# Patient Record
Sex: FEMALE | Race: WHITE | NOT HISPANIC OR LATINO | Employment: OTHER | URBAN - METROPOLITAN AREA
[De-identification: names, ages, dates, MRNs, and addresses within clinical notes are randomized per-mention and may not be internally consistent; named-entity substitution may affect disease eponyms.]

---

## 2023-02-21 PROBLEM — R42 ORTHOSTATIC LIGHTHEADEDNESS: Status: ACTIVE | Noted: 2023-02-21

## 2023-02-21 PROBLEM — I87.2 VENOUS INSUFFICIENCY OF BOTH LOWER EXTREMITIES: Status: ACTIVE | Noted: 2023-02-21

## 2023-02-21 PROBLEM — R79.89 ELEVATED SERUM CREATININE: Status: ACTIVE | Noted: 2023-02-21

## 2023-02-21 PROBLEM — E78.5 HYPERLIPIDEMIA: Status: ACTIVE | Noted: 2023-02-21

## 2023-02-21 PROBLEM — M17.11 PRIMARY OSTEOARTHRITIS OF RIGHT KNEE: Status: ACTIVE | Noted: 2023-02-21

## 2023-02-21 PROBLEM — E11.29 DIABETES MELLITUS WITH MICROALBUMINURIA (MULTI): Status: ACTIVE | Noted: 2023-02-21

## 2023-02-21 PROBLEM — E55.9 VITAMIN D DEFICIENCY: Status: ACTIVE | Noted: 2023-02-21

## 2023-02-21 PROBLEM — R32 URINARY INCONTINENCE: Status: ACTIVE | Noted: 2023-02-21

## 2023-02-21 PROBLEM — N28.9 NEPHROPATHY: Status: ACTIVE | Noted: 2023-02-21

## 2023-02-21 PROBLEM — R42 DIZZINESS, NONSPECIFIC: Status: ACTIVE | Noted: 2023-02-21

## 2023-02-21 PROBLEM — N89.8 VAGINAL PRURITUS: Status: ACTIVE | Noted: 2023-02-21

## 2023-02-21 PROBLEM — F41.8 SITUATIONAL ANXIETY: Status: ACTIVE | Noted: 2023-02-21

## 2023-02-21 PROBLEM — N18.30 CKD STAGE 3 SECONDARY TO DIABETES (MULTI): Status: ACTIVE | Noted: 2023-02-21

## 2023-02-21 PROBLEM — N20.0 NEPHROLITHIASIS: Status: ACTIVE | Noted: 2023-02-21

## 2023-02-21 PROBLEM — R79.89 LOW VITAMIN D LEVEL: Status: ACTIVE | Noted: 2023-02-21

## 2023-02-21 PROBLEM — E66.811 CLASS 1 OBESITY WITH BODY MASS INDEX (BMI) OF 31.0 TO 31.9 IN ADULT: Status: ACTIVE | Noted: 2023-02-21

## 2023-02-21 PROBLEM — M46.1 SACROILIITIS (CMS-HCC): Status: ACTIVE | Noted: 2023-02-21

## 2023-02-21 PROBLEM — L65.9 PATCHY LOSS OF HAIR: Status: ACTIVE | Noted: 2023-02-21

## 2023-02-21 PROBLEM — I44.2 THIRD DEGREE AV BLOCK (MULTI): Status: ACTIVE | Noted: 2023-02-21

## 2023-02-21 PROBLEM — M48.061 LUMBAR SPINAL STENOSIS: Status: ACTIVE | Noted: 2023-02-21

## 2023-02-21 PROBLEM — R35.89 POLYURIA: Status: ACTIVE | Noted: 2023-02-21

## 2023-02-21 PROBLEM — R30.0 DYSURIA: Status: ACTIVE | Noted: 2023-02-21

## 2023-02-21 PROBLEM — R42 VERTIGO: Status: ACTIVE | Noted: 2023-02-21

## 2023-02-21 PROBLEM — N32.89 BLADDER SPASMS: Status: ACTIVE | Noted: 2023-02-21

## 2023-02-21 PROBLEM — I48.91 AF (ATRIAL FIBRILLATION) (MULTI): Status: ACTIVE | Noted: 2023-02-21

## 2023-02-21 PROBLEM — M25.519 PAIN, JOINT, SHOULDER: Status: ACTIVE | Noted: 2023-02-21

## 2023-02-21 PROBLEM — R06.02 SHORTNESS OF BREATH: Status: ACTIVE | Noted: 2023-02-21

## 2023-02-21 PROBLEM — M21.70 LEG LENGTH DISCREPANCY: Status: ACTIVE | Noted: 2023-02-21

## 2023-02-21 PROBLEM — R39.9 UTI SYMPTOMS: Status: ACTIVE | Noted: 2023-02-21

## 2023-02-21 PROBLEM — K11.8 PAROTID MASS: Status: ACTIVE | Noted: 2023-02-21

## 2023-02-21 PROBLEM — E11.22 CKD STAGE 3 SECONDARY TO DIABETES (MULTI): Status: ACTIVE | Noted: 2023-02-21

## 2023-02-21 PROBLEM — R82.89 URINE CYTOLOGY ABNORMAL: Status: ACTIVE | Noted: 2023-02-21

## 2023-02-21 PROBLEM — B02.9 HERPES ZOSTER: Status: ACTIVE | Noted: 2023-02-21

## 2023-02-21 PROBLEM — M48.062 NEUROGENIC CLAUDICATION DUE TO LUMBAR SPINAL STENOSIS: Status: ACTIVE | Noted: 2023-02-21

## 2023-02-21 PROBLEM — R26.81 UNSTEADY GAIT: Status: ACTIVE | Noted: 2023-02-21

## 2023-02-21 PROBLEM — R39.15 URINARY URGENCY: Status: ACTIVE | Noted: 2023-02-21

## 2023-02-21 PROBLEM — L65.9 HAIR LOSS: Status: ACTIVE | Noted: 2023-02-21

## 2023-02-21 PROBLEM — M54.9 BACK PAIN, CHRONIC: Status: ACTIVE | Noted: 2023-02-21

## 2023-02-21 PROBLEM — R20.2 PARESTHESIA OF BILATERAL LEGS: Status: ACTIVE | Noted: 2023-02-21

## 2023-02-21 PROBLEM — M25.50 ARTHRALGIA OF MULTIPLE SITES: Status: ACTIVE | Noted: 2023-02-21

## 2023-02-21 PROBLEM — I25.10 CAD IN NATIVE ARTERY: Status: ACTIVE | Noted: 2023-02-21

## 2023-02-21 PROBLEM — E87.6 HYPOKALEMIA: Status: ACTIVE | Noted: 2023-02-21

## 2023-02-21 PROBLEM — G89.29 BACK PAIN, CHRONIC: Status: ACTIVE | Noted: 2023-02-21

## 2023-02-21 PROBLEM — R00.0 TACHYCARDIA, UNSPECIFIED: Status: ACTIVE | Noted: 2023-02-21

## 2023-02-21 PROBLEM — D47.2 IGG LAMBDA MONOCLONAL GAMMOPATHY: Status: ACTIVE | Noted: 2023-02-21

## 2023-02-21 PROBLEM — R29.898 HIP WEAKNESS: Status: ACTIVE | Noted: 2023-02-21

## 2023-02-21 PROBLEM — I49.5 SINOATRIAL NODE DYSFUNCTION (MULTI): Status: ACTIVE | Noted: 2023-02-21

## 2023-02-21 PROBLEM — E03.9 HYPOTHYROIDISM: Status: ACTIVE | Noted: 2023-02-21

## 2023-02-21 PROBLEM — Z95.0 PRESENCE OF CARDIAC PACEMAKER: Status: ACTIVE | Noted: 2023-02-21

## 2023-02-21 PROBLEM — M43.16 SPONDYLOLISTHESIS OF LUMBAR REGION: Status: ACTIVE | Noted: 2023-02-21

## 2023-02-21 PROBLEM — E66.9 CLASS 1 OBESITY WITH BODY MASS INDEX (BMI) OF 31.0 TO 31.9 IN ADULT: Status: ACTIVE | Noted: 2023-02-21

## 2023-02-21 PROBLEM — I10 HYPERTENSION: Status: ACTIVE | Noted: 2023-02-21

## 2023-02-21 PROBLEM — R79.9 ABNORMAL BLOOD CHEMISTRY: Status: ACTIVE | Noted: 2023-02-21

## 2023-02-21 PROBLEM — R80.9 DIABETES MELLITUS WITH MICROALBUMINURIA (MULTI): Status: ACTIVE | Noted: 2023-02-21

## 2023-02-21 PROBLEM — R79.89 ELEVATED BRAIN NATRIURETIC PEPTIDE (BNP) LEVEL: Status: ACTIVE | Noted: 2023-02-21

## 2023-02-21 RX ORDER — LISINOPRIL 5 MG/1
1 TABLET ORAL DAILY
COMMUNITY
Start: 2018-10-16 | End: 2023-05-23

## 2023-02-21 RX ORDER — BLOOD-GLUCOSE METER
KIT MISCELLANEOUS
COMMUNITY
End: 2023-03-28 | Stop reason: WASHOUT

## 2023-02-21 RX ORDER — ASPIRIN 81 MG/1
1 TABLET ORAL DAILY
COMMUNITY

## 2023-02-21 RX ORDER — OMEPRAZOLE 20 MG/1
TABLET, DELAYED RELEASE ORAL AS NEEDED
COMMUNITY
Start: 2017-12-14

## 2023-02-21 RX ORDER — ALPRAZOLAM 0.25 MG/1
1-2 TABLET ORAL NIGHTLY PRN
COMMUNITY
Start: 2018-03-23 | End: 2023-05-26 | Stop reason: SDUPTHER

## 2023-02-21 RX ORDER — MELOXICAM 15 MG/1
15 TABLET ORAL 2 TIMES WEEKLY
COMMUNITY
Start: 2018-11-21 | End: 2023-05-26 | Stop reason: SDUPTHER

## 2023-02-21 RX ORDER — ROSUVASTATIN CALCIUM 40 MG/1
1 TABLET, COATED ORAL DAILY
COMMUNITY
Start: 2017-02-28 | End: 2024-06-04 | Stop reason: SDUPTHER

## 2023-02-21 RX ORDER — LEVOTHYROXINE SODIUM 50 UG/1
1 TABLET ORAL DAILY
COMMUNITY
Start: 2019-03-14 | End: 2023-05-29 | Stop reason: SDUPTHER

## 2023-03-24 ASSESSMENT — ENCOUNTER SYMPTOMS
SPEECH DIFFICULTY: 0
WEIGHT LOSS: 0
SEIZURES: 0
TREMORS: 0
CONFUSION: 0
FATIGUE: 0
BLURRED VISION: 0
DIZZINESS: 0
HEADACHES: 0
POLYDIPSIA: 0
WEAKNESS: 0
NERVOUS/ANXIOUS: 0
HUNGER: 0
VISUAL CHANGE: 0
POLYPHAGIA: 0
BLACKOUTS: 0
SWEATS: 0

## 2023-03-28 ENCOUNTER — OFFICE VISIT (OUTPATIENT)
Dept: PRIMARY CARE | Facility: CLINIC | Age: 88
End: 2023-03-28
Payer: MEDICARE

## 2023-03-28 VITALS
OXYGEN SATURATION: 99 % | DIASTOLIC BLOOD PRESSURE: 72 MMHG | TEMPERATURE: 97.3 F | BODY MASS INDEX: 30 KG/M2 | WEIGHT: 174.8 LBS | SYSTOLIC BLOOD PRESSURE: 130 MMHG | HEART RATE: 85 BPM

## 2023-03-28 DIAGNOSIS — E11.22 CKD STAGE 3 SECONDARY TO DIABETES (MULTI): ICD-10-CM

## 2023-03-28 DIAGNOSIS — R80.9 DIABETES MELLITUS WITH MICROALBUMINURIA (MULTI): ICD-10-CM

## 2023-03-28 DIAGNOSIS — E11.29 DIABETES MELLITUS WITH MICROALBUMINURIA (MULTI): ICD-10-CM

## 2023-03-28 DIAGNOSIS — N18.30 CKD STAGE 3 SECONDARY TO DIABETES (MULTI): ICD-10-CM

## 2023-03-28 DIAGNOSIS — M46.1 SACROILIITIS (CMS-HCC): ICD-10-CM

## 2023-03-28 DIAGNOSIS — I48.0 PAROXYSMAL ATRIAL FIBRILLATION (MULTI): ICD-10-CM

## 2023-03-28 DIAGNOSIS — B02.29 POST HERPETIC NEURALGIA: Primary | ICD-10-CM

## 2023-03-28 DIAGNOSIS — Z00.00 ENCOUNTER FOR INITIAL PREVENTIVE PHYSICAL EXAMINATION COVERED BY MEDICARE: ICD-10-CM

## 2023-03-28 PROCEDURE — 3075F SYST BP GE 130 - 139MM HG: CPT | Performed by: STUDENT IN AN ORGANIZED HEALTH CARE EDUCATION/TRAINING PROGRAM

## 2023-03-28 PROCEDURE — 3078F DIAST BP <80 MM HG: CPT | Performed by: STUDENT IN AN ORGANIZED HEALTH CARE EDUCATION/TRAINING PROGRAM

## 2023-03-28 PROCEDURE — 99214 OFFICE O/P EST MOD 30 MIN: CPT | Performed by: STUDENT IN AN ORGANIZED HEALTH CARE EDUCATION/TRAINING PROGRAM

## 2023-03-28 PROCEDURE — 1160F RVW MEDS BY RX/DR IN RCRD: CPT | Performed by: STUDENT IN AN ORGANIZED HEALTH CARE EDUCATION/TRAINING PROGRAM

## 2023-03-28 PROCEDURE — 1036F TOBACCO NON-USER: CPT | Performed by: STUDENT IN AN ORGANIZED HEALTH CARE EDUCATION/TRAINING PROGRAM

## 2023-03-28 PROCEDURE — 1159F MED LIST DOCD IN RCRD: CPT | Performed by: STUDENT IN AN ORGANIZED HEALTH CARE EDUCATION/TRAINING PROGRAM

## 2023-03-28 RX ORDER — TRIAMCINOLONE ACETONIDE 1 MG/G
CREAM TOPICAL 2 TIMES DAILY
Qty: 15 G | Refills: 0 | Status: SHIPPED | OUTPATIENT
Start: 2023-03-28 | End: 2023-09-08 | Stop reason: ALTCHOICE

## 2023-03-28 ASSESSMENT — ENCOUNTER SYMPTOMS
SPEECH DIFFICULTY: 0
NERVOUS/ANXIOUS: 0
FATIGUE: 0
CONFUSION: 0
WEAKNESS: 0
HUNGER: 0
HEADACHES: 0
SWEATS: 0
VISUAL CHANGE: 0
POLYPHAGIA: 0
DIZZINESS: 0
SEIZURES: 0
TREMORS: 0
BLACKOUTS: 0
BLURRED VISION: 0
POLYDIPSIA: 0
WEIGHT LOSS: 0

## 2023-03-28 NOTE — ASSESSMENT & PLAN NOTE
Has been stable.  We will recheck.  She has seen nephrology in the past who has okayed all of her medications that she is currently on

## 2023-03-28 NOTE — PROGRESS NOTES
Subjective   Patient ID: Azucena Nolen is a 89 y.o. female who presents for Follow-up (Shingles and pain since January 15th.  ).    HPI:  Patient was dx with shingles on her left side of her back in January and she was given valacyclovir for a week. It did get better but she is still having nerve pain in the area. It is sometimes shooting. It is sensitive to touch. No more rash. She hasn't done anything for it.     Diabetes  She has type 2 diabetes mellitus. No MedicAlert identification noted. The initial diagnosis of diabetes was made 2 years ago. Pertinent negatives for hypoglycemia include no confusion, dizziness, headaches, hunger, mood changes, nervousness/anxiousness, pallor, seizures, sleepiness, speech difficulty, sweats or tremors. Associated symptoms include foot paresthesias. Pertinent negatives for diabetes include no blurred vision, no chest pain, no fatigue, no foot ulcerations, no polydipsia, no polyphagia, no polyuria, no visual change, no weakness and no weight loss. Pertinent negatives for hypoglycemia complications include no blackouts, no hospitalization, no nocturnal hypoglycemia, no required assistance and no required glucagon injection. Diabetic complications include heart disease, nephropathy and peripheral neuropathy. Pertinent negatives for diabetic complications include no CVA, impotence, PVD or retinopathy. Risk factors for coronary artery disease include dyslipidemia, family history, obesity and sedentary lifestyle. Current diabetic treatment includes diet and oral agent (monotherapy). She is compliant with treatment most of the time. She is following a generally healthy diet. Meal planning includes avoidance of concentrated sweets. She has not had a previous visit with a dietitian. She participates in exercise intermittently. She does not see a podiatrist.Eye exam is not current.      Her glucometer hasn't been working. She has been watching what she eats. She hasn't been having excess  sugar, just naturally in fruit.    She last had her kidney function checked in January her GFR was 29 which is slightly lower than normal.    She follows with cardiology for her atrial fibrillation.  She is on low-dose Eliquis and aspirin.    Review of Systems   Constitutional:  Negative for fatigue and weight loss.   Eyes:  Negative for blurred vision.   Cardiovascular:  Negative for chest pain.   Endocrine: Negative for polydipsia, polyphagia and polyuria.   Genitourinary:  Negative for impotence.   Skin:  Negative for pallor.   Neurological:  Negative for dizziness, tremors, seizures, speech difficulty, weakness and headaches.   Psychiatric/Behavioral:  Negative for confusion. The patient is not nervous/anxious.        Objective   /72 (BP Location: Left arm, Patient Position: Sitting, BP Cuff Size: Adult)   Pulse 85   Temp 36.3 °C (97.3 °F)   Wt 79.3 kg (174 lb 12.8 oz)   SpO2 99%   BMI 30.00 kg/m²     Physical Exam  Constitutional:       Appearance: Normal appearance.   HENT:      Head: Normocephalic and atraumatic.   Eyes:      Extraocular Movements: Extraocular movements intact.      Pupils: Pupils are equal, round, and reactive to light.   Cardiovascular:      Rate and Rhythm: Normal rate and regular rhythm.      Heart sounds: Normal heart sounds. No murmur heard.  Pulmonary:      Effort: Pulmonary effort is normal.      Breath sounds: Normal breath sounds. No wheezing.   Abdominal:      General: Bowel sounds are normal.      Palpations: Abdomen is soft.      Tenderness: There is no abdominal tenderness. There is no guarding.   Musculoskeletal:         General: Normal range of motion.      Comments: Normal gait   Skin:     General: Skin is warm and dry.      Findings: Rash present. Rash is macular and scaling.          Neurological:      General: No focal deficit present.      Mental Status: She is alert and oriented to person, place, and time.   Psychiatric:         Mood and Affect: Mood normal.          Behavior: Behavior normal.         Assessment/Plan   Problem List Items Addressed This Visit    None           Patient understands and agrees with treatment plan    Veronika Restrepo, DO   03/28/23

## 2023-03-28 NOTE — PATIENT INSTRUCTIONS
Will try to get a compounded topical medication for your post shingles nerve pain  Rx for triamcinolone to help with itching  Will f/up in May for a medicare physical with fasting blood work including diabetes labs

## 2023-03-28 NOTE — ASSESSMENT & PLAN NOTE
Prescription for triamcinolone sent to pharmacy to see if that helps with some of her itching.  I am going to try and see if we can get a compounded gabapentin to help with the postherpetic neuralgia pain that she is having.  Have a call into a pharmacy and they are getting back with me about formulations.

## 2023-05-12 DIAGNOSIS — E03.9 HYPOTHYROIDISM, UNSPECIFIED: ICD-10-CM

## 2023-05-15 ENCOUNTER — LAB (OUTPATIENT)
Dept: LAB | Facility: LAB | Age: 88
End: 2023-05-15
Payer: MEDICARE

## 2023-05-15 DIAGNOSIS — R80.9 DIABETES MELLITUS WITH MICROALBUMINURIA (MULTI): ICD-10-CM

## 2023-05-15 DIAGNOSIS — E11.29 DIABETES MELLITUS WITH MICROALBUMINURIA (MULTI): ICD-10-CM

## 2023-05-15 LAB
ALANINE AMINOTRANSFERASE (SGPT) (U/L) IN SER/PLAS: 23 U/L (ref 7–45)
ALBUMIN (G/DL) IN SER/PLAS: 4 G/DL (ref 3.4–5)
ALKALINE PHOSPHATASE (U/L) IN SER/PLAS: 74 U/L (ref 33–136)
ANION GAP IN SER/PLAS: 15 MMOL/L (ref 10–20)
ASPARTATE AMINOTRANSFERASE (SGOT) (U/L) IN SER/PLAS: 20 U/L (ref 9–39)
BILIRUBIN TOTAL (MG/DL) IN SER/PLAS: 0.4 MG/DL (ref 0–1.2)
CALCIUM (MG/DL) IN SER/PLAS: 9 MG/DL (ref 8.6–10.3)
CARBON DIOXIDE, TOTAL (MMOL/L) IN SER/PLAS: 23 MMOL/L (ref 21–32)
CHLORIDE (MMOL/L) IN SER/PLAS: 107 MMOL/L (ref 98–107)
CHOLESTEROL (MG/DL) IN SER/PLAS: 116 MG/DL (ref 0–199)
CHOLESTEROL IN HDL (MG/DL) IN SER/PLAS: 48.4 MG/DL
CHOLESTEROL/HDL RATIO: 2.4
CREATININE (MG/DL) IN SER/PLAS: 1.35 MG/DL (ref 0.5–1.05)
ERYTHROCYTE DISTRIBUTION WIDTH (RATIO) BY AUTOMATED COUNT: 12.5 % (ref 11.5–14.5)
ERYTHROCYTE MEAN CORPUSCULAR HEMOGLOBIN CONCENTRATION (G/DL) BY AUTOMATED: 31.8 G/DL (ref 32–36)
ERYTHROCYTE MEAN CORPUSCULAR VOLUME (FL) BY AUTOMATED COUNT: 96 FL (ref 80–100)
ERYTHROCYTES (10*6/UL) IN BLOOD BY AUTOMATED COUNT: 4.08 X10E12/L (ref 4–5.2)
ESTIMATED AVERAGE GLUCOSE FOR HBA1C: 186 MG/DL
GFR FEMALE: 37 ML/MIN/1.73M2
GLUCOSE (MG/DL) IN SER/PLAS: 185 MG/DL (ref 74–99)
HEMATOCRIT (%) IN BLOOD BY AUTOMATED COUNT: 39 % (ref 36–46)
HEMOGLOBIN (G/DL) IN BLOOD: 12.4 G/DL (ref 12–16)
HEMOGLOBIN A1C/HEMOGLOBIN TOTAL IN BLOOD: 8.1 %
LDL: 35 MG/DL (ref 0–99)
LEUKOCYTES (10*3/UL) IN BLOOD BY AUTOMATED COUNT: 10 X10E9/L (ref 4.4–11.3)
PLATELETS (10*3/UL) IN BLOOD AUTOMATED COUNT: 162 X10E9/L (ref 150–450)
POTASSIUM (MMOL/L) IN SER/PLAS: 4 MMOL/L (ref 3.5–5.3)
PROTEIN TOTAL: 7 G/DL (ref 6.4–8.2)
SODIUM (MMOL/L) IN SER/PLAS: 141 MMOL/L (ref 136–145)
TRIGLYCERIDE (MG/DL) IN SER/PLAS: 161 MG/DL (ref 0–149)
UREA NITROGEN (MG/DL) IN SER/PLAS: 18 MG/DL (ref 6–23)
VLDL: 32 MG/DL (ref 0–40)

## 2023-05-15 PROCEDURE — 80061 LIPID PANEL: CPT

## 2023-05-15 PROCEDURE — 82570 ASSAY OF URINE CREATININE: CPT

## 2023-05-15 PROCEDURE — 83036 HEMOGLOBIN GLYCOSYLATED A1C: CPT

## 2023-05-15 PROCEDURE — 80053 COMPREHEN METABOLIC PANEL: CPT

## 2023-05-15 PROCEDURE — 82043 UR ALBUMIN QUANTITATIVE: CPT

## 2023-05-15 PROCEDURE — 36415 COLL VENOUS BLD VENIPUNCTURE: CPT

## 2023-05-15 PROCEDURE — 85027 COMPLETE CBC AUTOMATED: CPT

## 2023-05-16 LAB
ALBUMIN (MG/L) IN URINE: 299.2 MG/L
ALBUMIN/CREATININE (UG/MG) IN URINE: 226.7 UG/MG CRT (ref 0–30)
CREATININE (MG/DL) IN URINE: 132 MG/DL (ref 20–320)

## 2023-05-19 DIAGNOSIS — E03.9 HYPOTHYROIDISM, UNSPECIFIED TYPE: Primary | ICD-10-CM

## 2023-05-20 DIAGNOSIS — E11.29 TYPE 2 DIABETES MELLITUS WITH OTHER DIABETIC KIDNEY COMPLICATION (MULTI): ICD-10-CM

## 2023-05-20 DIAGNOSIS — R80.9 PROTEINURIA, UNSPECIFIED: ICD-10-CM

## 2023-05-23 RX ORDER — LISINOPRIL 5 MG/1
TABLET ORAL
Qty: 90 TABLET | Refills: 1 | Status: SHIPPED | OUTPATIENT
Start: 2023-05-23 | End: 2023-11-18

## 2023-05-24 ENCOUNTER — LAB (OUTPATIENT)
Dept: LAB | Facility: LAB | Age: 88
End: 2023-05-24
Payer: MEDICARE

## 2023-05-24 DIAGNOSIS — E03.9 HYPOTHYROIDISM, UNSPECIFIED TYPE: ICD-10-CM

## 2023-05-24 LAB — THYROTROPIN (MIU/L) IN SER/PLAS BY DETECTION LIMIT <= 0.05 MIU/L: 2.92 MIU/L (ref 0.44–3.98)

## 2023-05-24 PROCEDURE — 84443 ASSAY THYROID STIM HORMONE: CPT

## 2023-05-24 PROCEDURE — 36415 COLL VENOUS BLD VENIPUNCTURE: CPT

## 2023-05-24 RX ORDER — LEVOTHYROXINE SODIUM 50 UG/1
TABLET ORAL
Qty: 90 TABLET | Refills: 3 | OUTPATIENT
Start: 2023-05-24

## 2023-05-26 ENCOUNTER — OFFICE VISIT (OUTPATIENT)
Dept: PRIMARY CARE | Facility: CLINIC | Age: 88
End: 2023-05-26
Payer: MEDICARE

## 2023-05-26 VITALS
DIASTOLIC BLOOD PRESSURE: 58 MMHG | HEART RATE: 86 BPM | OXYGEN SATURATION: 96 % | SYSTOLIC BLOOD PRESSURE: 116 MMHG | WEIGHT: 173.8 LBS | BODY MASS INDEX: 29.83 KG/M2 | TEMPERATURE: 97.9 F

## 2023-05-26 DIAGNOSIS — E11.22 CKD STAGE 3 SECONDARY TO DIABETES (MULTI): Primary | ICD-10-CM

## 2023-05-26 DIAGNOSIS — M54.50 CHRONIC LOW BACK PAIN WITHOUT SCIATICA, UNSPECIFIED BACK PAIN LATERALITY: ICD-10-CM

## 2023-05-26 DIAGNOSIS — N18.30 CKD STAGE 3 SECONDARY TO DIABETES (MULTI): Primary | ICD-10-CM

## 2023-05-26 DIAGNOSIS — Z00.00 ENCOUNTER FOR INITIAL PREVENTIVE PHYSICAL EXAMINATION COVERED BY MEDICARE: ICD-10-CM

## 2023-05-26 DIAGNOSIS — F41.9 ANXIETY: ICD-10-CM

## 2023-05-26 DIAGNOSIS — E11.29 DIABETES MELLITUS WITH MICROALBUMINURIA (MULTI): ICD-10-CM

## 2023-05-26 DIAGNOSIS — E03.9 HYPOTHYROIDISM, UNSPECIFIED TYPE: ICD-10-CM

## 2023-05-26 DIAGNOSIS — G89.29 CHRONIC LOW BACK PAIN WITHOUT SCIATICA, UNSPECIFIED BACK PAIN LATERALITY: ICD-10-CM

## 2023-05-26 DIAGNOSIS — R80.9 DIABETES MELLITUS WITH MICROALBUMINURIA (MULTI): ICD-10-CM

## 2023-05-26 PROCEDURE — 99214 OFFICE O/P EST MOD 30 MIN: CPT | Performed by: STUDENT IN AN ORGANIZED HEALTH CARE EDUCATION/TRAINING PROGRAM

## 2023-05-26 PROCEDURE — 3074F SYST BP LT 130 MM HG: CPT | Performed by: STUDENT IN AN ORGANIZED HEALTH CARE EDUCATION/TRAINING PROGRAM

## 2023-05-26 PROCEDURE — 1160F RVW MEDS BY RX/DR IN RCRD: CPT | Performed by: STUDENT IN AN ORGANIZED HEALTH CARE EDUCATION/TRAINING PROGRAM

## 2023-05-26 PROCEDURE — 1036F TOBACCO NON-USER: CPT | Performed by: STUDENT IN AN ORGANIZED HEALTH CARE EDUCATION/TRAINING PROGRAM

## 2023-05-26 PROCEDURE — 1159F MED LIST DOCD IN RCRD: CPT | Performed by: STUDENT IN AN ORGANIZED HEALTH CARE EDUCATION/TRAINING PROGRAM

## 2023-05-26 PROCEDURE — 3078F DIAST BP <80 MM HG: CPT | Performed by: STUDENT IN AN ORGANIZED HEALTH CARE EDUCATION/TRAINING PROGRAM

## 2023-05-26 RX ORDER — GLIMEPIRIDE 1 MG/1
1 TABLET ORAL
Qty: 30 TABLET | Refills: 11 | Status: SHIPPED | OUTPATIENT
Start: 2023-05-26 | End: 2023-06-23

## 2023-05-26 RX ORDER — MELOXICAM 15 MG/1
15 TABLET ORAL 2 TIMES WEEKLY
Qty: 90 TABLET | Refills: 1 | Status: SHIPPED | OUTPATIENT
Start: 2023-05-29

## 2023-05-26 RX ORDER — ALPRAZOLAM 0.25 MG/1
.25-.5 TABLET ORAL NIGHTLY PRN
Qty: 7 TABLET | Refills: 0 | Status: SHIPPED | OUTPATIENT
Start: 2023-05-26

## 2023-05-26 ASSESSMENT — PATIENT HEALTH QUESTIONNAIRE - PHQ9
1. LITTLE INTEREST OR PLEASURE IN DOING THINGS: NOT AT ALL
2. FEELING DOWN, DEPRESSED OR HOPELESS: NOT AT ALL
SUM OF ALL RESPONSES TO PHQ9 QUESTIONS 1 AND 2: 0

## 2023-05-26 ASSESSMENT — ENCOUNTER SYMPTOMS
WHEEZING: 0
DYSPHORIC MOOD: 0
HEADACHES: 0
PALPITATIONS: 0
DIZZINESS: 0
CHILLS: 0
DIARRHEA: 0
SHORTNESS OF BREATH: 0
FATIGUE: 0
FEVER: 0
HEMATURIA: 0
FREQUENCY: 0
NERVOUS/ANXIOUS: 0
CONSTIPATION: 0
DYSURIA: 0
ABDOMINAL PAIN: 0
COUGH: 0
NAUSEA: 0
NUMBNESS: 0

## 2023-05-26 NOTE — ASSESSMENT & PLAN NOTE
Refilled meloxicam.  She is aware that she really should not be taking meloxicam with Eliquis.  She states that her cardiologist is aware of this and is okay with that.  She is aware of the increased risk of bleeds

## 2023-05-26 NOTE — PROGRESS NOTES
Subjective   Patient ID: Azucena Nolen is a 89 y.o. female who presents for Labs Only (Follow up).    HPI   Patient here to follow-up on recent blood work. Patient would also like a refill on her xanax and meloxicam. Last xanax refill was 2021. She takes it very sparingly, last took a bout 6 months ago.  She is on meloxicam and her cardiologist is aware and okay given she is on eliquis. She takes it twice a week.  She also follows a low carbohydrate diet.  She does not completely eliminated but she tries to moderate it.  Her sugars are averaging in the 180s.    Hemoglobin A1C   Date/Time Value Ref Range Status   05/15/2023 09:39 AM 8.1 (A) % Final     Comment:          Diagnosis of Diabetes-Adults   Non-Diabetic: < or = 5.6%   Increased risk for developing diabetes: 5.7-6.4%   Diagnostic of diabetes: > or = 6.5%  .       Monitoring of Diabetes                Age (y)     Therapeutic Goal (%)   Adults:          >18           <7.0   Pediatrics:    13-18           <7.5                   7-12           <8.0                   0- 6            7.5-8.5   American Diabetes Association. Diabetes Care 33(S1), Jan 2010.     Glucose   Date/Time Value Ref Range Status   05/15/2023 09:39  (H) 74 - 99 mg/dL Final     GFR Female   Date/Time Value Ref Range Status   05/15/2023 09:39 AM 37 (A) >90 mL/min/1.73m2 Final     Comment:      CALCULATIONS OF ESTIMATED GFR ARE PERFORMED   USING THE 2021 CKD-EPI STUDY REFIT EQUATION   WITHOUT THE RACE VARIABLE FOR THE IDMS-TRACEABLE   CREATININE METHODS.    https://jasn.asnjournals.org/content/early/2021/09/22/ASN.5804254145     Urea Nitrogen   Date/Time Value Ref Range Status   05/15/2023 09:39 AM 18 6 - 23 mg/dL Final     Creatinine   Date/Time Value Ref Range Status   05/15/2023 09:39 AM 1.35 (H) 0.50 - 1.05 mg/dL Final     Albumin   Date/Time Value Ref Range Status   05/15/2023 09:39 AM 4.0 3.4 - 5.0 g/dL Final     Albumin/Creatine Ratio   Date/Time Value Ref Range Status   05/15/2023  09:47 .7 (H) 0.0 - 30.0 ug/mg crt Final     OARRS:  Veronika Restrepo DO on 5/26/2023 11:45 AM  I have personally reviewed the OARRS report for Azucena Nolen. I have considered the risks of abuse, dependence, addiction and diversion    Is the patient prescribed a combination of a benzodiazepine and opioid?  No    Last Urine Drug Screen / ordered today: No  No results found for this or any previous visit (from the past 07657 hour(s)).  N/A    Controlled Substance Agreement:  Date of the Last Agreement: 5/26/2023  Reviewed Controlled Substance Agreement including but not limited to the benefits, risks, and alternatives to treatment with a Controlled Substance medication(s).    Benzodiazepines:  What is the patient's goal of therapy?  Decrease anxiety  Is this being achieved with current treatment?  Yes    PREETI-7:  No data recorded    Activities of Daily Living:   Is your overall impression that this patient is benefiting (symptom reduction outweighs side effects) from benzodiazepine therapy? Yes     1. Physical Functioning: Better  2. Family Relationship: Same  3. Social Relationship: Better  4. Mood: Better  5. Sleep Patterns: Better  6. Overall Function: Better    Review of Systems   Constitutional:  Negative for chills, fatigue and fever.   Respiratory:  Negative for cough, shortness of breath and wheezing.    Cardiovascular:  Negative for chest pain, palpitations and leg swelling.   Gastrointestinal:  Negative for abdominal pain, constipation, diarrhea and nausea.   Genitourinary:  Negative for dysuria, frequency, hematuria and urgency.   Neurological:  Negative for dizziness, numbness and headaches.   Psychiatric/Behavioral:  Negative for dysphoric mood. The patient is not nervous/anxious.        Objective   /58 (BP Location: Left arm, Patient Position: Sitting, BP Cuff Size: Adult)   Pulse 86   Temp 36.6 °C (97.9 °F) (Temporal)   Wt 78.8 kg (173 lb 12.8 oz)   SpO2 96%   BMI 29.83 kg/m²     Physical  Exam  Constitutional:       Appearance: Normal appearance.   HENT:      Head: Normocephalic and atraumatic.   Eyes:      Extraocular Movements: Extraocular movements intact.      Pupils: Pupils are equal, round, and reactive to light.   Cardiovascular:      Rate and Rhythm: Normal rate and regular rhythm.      Heart sounds: Normal heart sounds. No murmur heard.  Pulmonary:      Effort: Pulmonary effort is normal.      Breath sounds: Normal breath sounds. No wheezing.   Abdominal:      General: Bowel sounds are normal.      Palpations: Abdomen is soft.      Tenderness: There is no abdominal tenderness. There is no guarding.   Musculoskeletal:         General: Normal range of motion.   Skin:     General: Skin is warm and dry.   Neurological:      General: No focal deficit present.      Mental Status: She is alert and oriented to person, place, and time.   Psychiatric:         Mood and Affect: Mood normal.         Behavior: Behavior normal.         Assessment/Plan   Problem List Items Addressed This Visit       Back pain, chronic     Refilled meloxicam.  She is aware that she really should not be taking meloxicam with Eliquis.  She states that her cardiologist is aware of this and is okay with that.  She is aware of the increased risk of bleeds         Relevant Medications    meloxicam (Mobic) 15 mg tablet (Start on 5/29/2023)    CKD stage 3 secondary to diabetes (CMS/HCC) - Primary     Staying stable.  We will continue to monitor.  Patient is aware that meloxicam can worsen her kidney function         Diabetes mellitus with microalbuminuria (CMS/HCC)     Improving.  To start 1 mg of glimepiride and have her continue to check her sugars.  I would like to see her back in 3 months to recheck this and at that time we can do a Medicare wellness exam.  Goal would be to get the patient into the sevens for A1c         Relevant Medications    glimepiride (AmaryL) 1 mg tablet    Anxiety     Refilled the patient's alprazolam.   She has not had this filled in several years and uses it very sparingly.  She is aware of the increased risk of falls as well as cognitive issues and would like to stay on this medication.  I personally have reviewed the OARRS report for this patient.  This report is scanned into the electronic medical record.  I have considered the risks of abuse, dependence, addiction and diversion.  I believe that it is clinically appropriate for the patient to be prescribed this medication.  Urine drug screen and drug contract either pending or up-to-date.         Relevant Medications    ALPRAZolam (Xanax) 0.25 mg tablet     Other Visit Diagnoses       Encounter for initial preventive physical examination covered by Medicare            Appt accidentally scheduled as initial medicare wellness. Medicare wellness not done today. Will do this in September.         Patient understands and agrees with treatment plan    Veronika Restrepo, DO   05/26/23

## 2023-05-26 NOTE — ASSESSMENT & PLAN NOTE
Staying stable.  We will continue to monitor.  Patient is aware that meloxicam can worsen her kidney function

## 2023-05-26 NOTE — ASSESSMENT & PLAN NOTE
Refilled the patient's alprazolam.  She has not had this filled in several years and uses it very sparingly.  She is aware of the increased risk of falls as well as cognitive issues and would like to stay on this medication.  I personally have reviewed the OARRS report for this patient.  This report is scanned into the electronic medical record.  I have considered the risks of abuse, dependence, addiction and diversion.  I believe that it is clinically appropriate for the patient to be prescribed this medication.  Urine drug screen and drug contract either pending or up-to-date.

## 2023-05-26 NOTE — ASSESSMENT & PLAN NOTE
Improving.  To start 1 mg of glimepiride and have her continue to check her sugars.  I would like to see her back in 3 months to recheck this and at that time we can do a Medicare wellness exam.  Goal would be to get the patient into the sevens for A1c

## 2023-05-29 RX ORDER — LEVOTHYROXINE SODIUM 50 UG/1
50 TABLET ORAL DAILY
Qty: 90 TABLET | Refills: 3 | Status: SHIPPED | OUTPATIENT
Start: 2023-05-29 | End: 2024-05-22

## 2023-05-30 ENCOUNTER — TELEPHONE (OUTPATIENT)
Dept: PRIMARY CARE | Facility: CLINIC | Age: 88
End: 2023-05-30
Payer: MEDICARE

## 2023-05-30 DIAGNOSIS — E11.29 DIABETES MELLITUS WITH MICROALBUMINURIA (MULTI): Primary | ICD-10-CM

## 2023-05-30 DIAGNOSIS — R80.9 DIABETES MELLITUS WITH MICROALBUMINURIA (MULTI): Primary | ICD-10-CM

## 2023-05-31 ENCOUNTER — TELEPHONE (OUTPATIENT)
Dept: PRIMARY CARE | Facility: CLINIC | Age: 88
End: 2023-05-31
Payer: MEDICARE

## 2023-05-31 DIAGNOSIS — E11.9 TYPE 2 DIABETES MELLITUS WITHOUT COMPLICATION, UNSPECIFIED WHETHER LONG TERM INSULIN USE (MULTI): ICD-10-CM

## 2023-06-06 ENCOUNTER — TELEPHONE (OUTPATIENT)
Dept: PRIMARY CARE | Facility: CLINIC | Age: 88
End: 2023-06-06
Payer: MEDICARE

## 2023-06-06 NOTE — TELEPHONE ENCOUNTER
----- Message from Veronika Restrepo DO sent at 6/6/2023  9:08 AM EDT -----  Regarding: RE: result  No, that is fine    pratibha woodruff  ----- Message -----  From: Lynne Hinton CMA  Sent: 6/2/2023   9:33 AM EDT  To: Veronika Restrepo DO  Subject: FW: result                                       Patient has appointment in September, do you want to see her sooner for kidney function?  ----- Message -----  From: Veronika Restrepo DO  Sent: 6/1/2023  12:44 PM EDT  To: Lynne Hinton CMA  Subject: RE: result                                       We can continue working on that.  That is fine  Audrey WOODRUFF  ----- Message -----  From: Lynne Hinton CMA  Sent: 5/30/2023   3:46 PM EDT  To: Veronika Restrepo DO  Subject: result                                           Patient doesn't really want to see Endocrinology. Could she just try diet.  ----- Message -----  From: Veronika Restrepo DO  Sent: 5/19/2023   5:14 PM EDT  To: Lynne Hinton CMA    Please let the patient know that her A1c is still elevated at 8.1, that is a little bit better than it has been in the past.  She is spilling some protein in her urine and her kidney function is still low but staying stable.  I feel that she needs changes in her medications but with her kidney function the way it is, I I would like to see if she can get into an endocrinologist for further evaluation of this.  If she is okay with that, we put in a referral for endocrinology through [place] for type 2 diabetes mellitus

## 2023-06-20 DIAGNOSIS — R80.9 DIABETES MELLITUS WITH MICROALBUMINURIA (MULTI): ICD-10-CM

## 2023-06-20 DIAGNOSIS — E11.29 DIABETES MELLITUS WITH MICROALBUMINURIA (MULTI): ICD-10-CM

## 2023-06-23 RX ORDER — GLIMEPIRIDE 1 MG/1
1 TABLET ORAL
Qty: 30 TABLET | Refills: 11 | Status: SHIPPED | OUTPATIENT
Start: 2023-06-23 | End: 2024-06-04 | Stop reason: SDUPTHER

## 2023-08-30 DIAGNOSIS — E11.29 DIABETES MELLITUS WITH MICROALBUMINURIA (MULTI): Primary | ICD-10-CM

## 2023-08-30 DIAGNOSIS — R80.9 DIABETES MELLITUS WITH MICROALBUMINURIA (MULTI): Primary | ICD-10-CM

## 2023-09-08 ENCOUNTER — LAB (OUTPATIENT)
Dept: LAB | Facility: LAB | Age: 88
End: 2023-09-08
Payer: MEDICARE

## 2023-09-08 ENCOUNTER — OFFICE VISIT (OUTPATIENT)
Dept: PRIMARY CARE | Facility: CLINIC | Age: 88
End: 2023-09-08
Payer: MEDICARE

## 2023-09-08 VITALS
BODY MASS INDEX: 30.47 KG/M2 | DIASTOLIC BLOOD PRESSURE: 80 MMHG | HEART RATE: 76 BPM | TEMPERATURE: 97.4 F | WEIGHT: 177.4 LBS | SYSTOLIC BLOOD PRESSURE: 120 MMHG | OXYGEN SATURATION: 96 %

## 2023-09-08 DIAGNOSIS — Z23 ENCOUNTER FOR IMMUNIZATION: ICD-10-CM

## 2023-09-08 DIAGNOSIS — E55.9 VITAMIN D DEFICIENCY: ICD-10-CM

## 2023-09-08 DIAGNOSIS — Z00.00 MEDICARE ANNUAL WELLNESS VISIT, SUBSEQUENT: Primary | ICD-10-CM

## 2023-09-08 DIAGNOSIS — E11.29 DIABETES MELLITUS WITH MICROALBUMINURIA (MULTI): ICD-10-CM

## 2023-09-08 DIAGNOSIS — R80.9 DIABETES MELLITUS WITH MICROALBUMINURIA (MULTI): ICD-10-CM

## 2023-09-08 LAB
ANION GAP IN SER/PLAS: 11 MMOL/L (ref 10–20)
CALCIDIOL (25 OH VITAMIN D3) (NG/ML) IN SER/PLAS: 12 NG/ML
CALCIUM (MG/DL) IN SER/PLAS: 8.9 MG/DL (ref 8.6–10.3)
CARBON DIOXIDE, TOTAL (MMOL/L) IN SER/PLAS: 26 MMOL/L (ref 21–32)
CHLORIDE (MMOL/L) IN SER/PLAS: 107 MMOL/L (ref 98–107)
CREATININE (MG/DL) IN SER/PLAS: 1.32 MG/DL (ref 0.5–1.05)
GFR FEMALE: 38 ML/MIN/1.73M2
GLUCOSE (MG/DL) IN SER/PLAS: 137 MG/DL (ref 74–99)
POTASSIUM (MMOL/L) IN SER/PLAS: 4.1 MMOL/L (ref 3.5–5.3)
SODIUM (MMOL/L) IN SER/PLAS: 140 MMOL/L (ref 136–145)
UREA NITROGEN (MG/DL) IN SER/PLAS: 24 MG/DL (ref 6–23)

## 2023-09-08 PROCEDURE — 1160F RVW MEDS BY RX/DR IN RCRD: CPT | Performed by: STUDENT IN AN ORGANIZED HEALTH CARE EDUCATION/TRAINING PROGRAM

## 2023-09-08 PROCEDURE — 1126F AMNT PAIN NOTED NONE PRSNT: CPT | Performed by: STUDENT IN AN ORGANIZED HEALTH CARE EDUCATION/TRAINING PROGRAM

## 2023-09-08 PROCEDURE — 83036 HEMOGLOBIN GLYCOSYLATED A1C: CPT

## 2023-09-08 PROCEDURE — 80048 BASIC METABOLIC PNL TOTAL CA: CPT

## 2023-09-08 PROCEDURE — 1159F MED LIST DOCD IN RCRD: CPT | Performed by: STUDENT IN AN ORGANIZED HEALTH CARE EDUCATION/TRAINING PROGRAM

## 2023-09-08 PROCEDURE — 1036F TOBACCO NON-USER: CPT | Performed by: STUDENT IN AN ORGANIZED HEALTH CARE EDUCATION/TRAINING PROGRAM

## 2023-09-08 PROCEDURE — 36415 COLL VENOUS BLD VENIPUNCTURE: CPT

## 2023-09-08 PROCEDURE — 3074F SYST BP LT 130 MM HG: CPT | Performed by: STUDENT IN AN ORGANIZED HEALTH CARE EDUCATION/TRAINING PROGRAM

## 2023-09-08 PROCEDURE — G0008 ADMIN INFLUENZA VIRUS VAC: HCPCS | Performed by: STUDENT IN AN ORGANIZED HEALTH CARE EDUCATION/TRAINING PROGRAM

## 2023-09-08 PROCEDURE — 3079F DIAST BP 80-89 MM HG: CPT | Performed by: STUDENT IN AN ORGANIZED HEALTH CARE EDUCATION/TRAINING PROGRAM

## 2023-09-08 PROCEDURE — 82306 VITAMIN D 25 HYDROXY: CPT

## 2023-09-08 PROCEDURE — G0439 PPPS, SUBSEQ VISIT: HCPCS | Performed by: STUDENT IN AN ORGANIZED HEALTH CARE EDUCATION/TRAINING PROGRAM

## 2023-09-08 PROCEDURE — 90662 IIV NO PRSV INCREASED AG IM: CPT | Performed by: STUDENT IN AN ORGANIZED HEALTH CARE EDUCATION/TRAINING PROGRAM

## 2023-09-08 PROCEDURE — 1170F FXNL STATUS ASSESSED: CPT | Performed by: STUDENT IN AN ORGANIZED HEALTH CARE EDUCATION/TRAINING PROGRAM

## 2023-09-08 ASSESSMENT — ENCOUNTER SYMPTOMS
COUGH: 0
HEMATURIA: 0
PALPITATIONS: 0
FATIGUE: 0
CONSTIPATION: 0
FREQUENCY: 0
FEVER: 0
HEADACHES: 0
ABDOMINAL PAIN: 0
DIZZINESS: 0
WHEEZING: 0
DIARRHEA: 0
DYSURIA: 0
DYSPHORIC MOOD: 0
NUMBNESS: 0
NAUSEA: 0
SHORTNESS OF BREATH: 0
NERVOUS/ANXIOUS: 0
CHILLS: 0

## 2023-09-08 ASSESSMENT — ACTIVITIES OF DAILY LIVING (ADL)
DRESSING: INDEPENDENT
DOING_HOUSEWORK: INDEPENDENT
TAKING_MEDICATION: INDEPENDENT
MANAGING_FINANCES: INDEPENDENT
BATHING: INDEPENDENT
GROCERY_SHOPPING: INDEPENDENT

## 2023-09-08 ASSESSMENT — PATIENT HEALTH QUESTIONNAIRE - PHQ9
SUM OF ALL RESPONSES TO PHQ9 QUESTIONS 1 AND 2: 0
1. LITTLE INTEREST OR PLEASURE IN DOING THINGS: NOT AT ALL
2. FEELING DOWN, DEPRESSED OR HOPELESS: NOT AT ALL

## 2023-09-08 NOTE — PROGRESS NOTES
Subjective   Reason for Visit: Azucena Nolen is an 89 y.o. female here for a Medicare Wellness visit.   Past Medical, Surgical, and Family History reviewed and updated in chart.    Reviewed all medications by prescribing practitioner or clinical pharmacist (such as prescriptions, OTCs, herbal therapies and supplements) and documented in the medical record.    HPI  Specialists seen by patient: dentist  -derm    Last pap/cervical cancer screening: n/a  Last mammogram:  N/A  Hx of colon ca screening: n/a  Hx of DXA: no declines  History of depression or anxiety:yes situational  Immunizations: not up to date - hasn't had shingrix, will do flu today, declines tdap, will get covid  Diet: Follows a healthy diet  Exercise: Some exercise, has access to fitness center there  Alcohol abuse screen:   2 drinks per week    How many times in the past year 4 or more drinks in a day? 0  Lung cancer screening:   Smoking history: ex-smoker and quit in the 1950s  Drug use: No    Patient Self Assessment of Health Status  Patient Self Assessment: Fair  Functional Ability/Level of Safety  Cognitive Impairment Observed: No cognitive impairment observed  [place] Home Safety Risk Factors  Home Safety Risk Factors: None  Nutrition and Exercise  Current Diet: Well Balanced Diet  Adequate Fluid Intake: No  Caffeine: Yes  Exercise Frequency: Infrequently  ADL Screening  Hearing - Right Ear: Hearing aid  Hearing - Left Ear: Hearing aid  Bathing: Independent  Dressing: Independent  Walks in Home: Independent  ADL Comments: Patient lives in independent living at [place]  IADL's  Managing Finances: Independent  Grocery Shopping: Independent  Taking Medication: Independent  Doing Housework: Independent      Patient Care Team:  Veronika Restrepo DO as PCP - General  Veronika Restrepo DO as PCP - [facility] Att[facility]     Review of Systems   Constitutional:  Negative for chills, fatigue and fever.   Respiratory:  Negative for cough, shortness  of breath and wheezing.    Cardiovascular:  Negative for chest pain, palpitations and leg swelling.   Gastrointestinal:  Negative for abdominal pain, constipation, diarrhea and nausea.   Genitourinary:  Negative for dysuria, frequency, hematuria and urgency.   Neurological:  Negative for dizziness, numbness and headaches.   Psychiatric/Behavioral:  Negative for dysphoric mood. The patient is not nervous/anxious.        Objective   Vitals:  /80 (BP Location: Right arm, Patient Position: Sitting, BP Cuff Size: Adult)   Pulse 76   Temp 36.3 °C (97.4 °F) (Temporal)   Wt 80.5 kg (177 lb 6.4 oz)   SpO2 96%   BMI 30.47 kg/m²       Physical Exam  Constitutional:       General: She is not in acute distress.     Appearance: Normal appearance.   HENT:      Head: Normocephalic and atraumatic.      Right Ear: Tympanic membrane and ear canal normal.      Left Ear: Tympanic membrane and ear canal normal.      Mouth/Throat:      Mouth: Mucous membranes are moist.      Pharynx: No posterior oropharyngeal erythema.   Eyes:      Extraocular Movements: Extraocular movements intact.      Pupils: Pupils are equal, round, and reactive to light.   Cardiovascular:      Rate and Rhythm: Normal rate and regular rhythm.      Heart sounds: No murmur heard.  Pulmonary:      Effort: Pulmonary effort is normal. No respiratory distress.      Breath sounds: Normal breath sounds. No wheezing.   Abdominal:      General: Bowel sounds are normal.      Palpations: Abdomen is soft.      Tenderness: There is no abdominal tenderness. There is no guarding.   Musculoskeletal:         General: Normal range of motion.      Cervical back: Neck supple.   Skin:     General: Skin is warm and dry.   Neurological:      General: No focal deficit present.      Mental Status: She is alert and oriented to person, place, and time.   Psychiatric:         Mood and Affect: Mood normal.         Behavior: Behavior normal.         Assessment/Plan   Problem List Items  Addressed This Visit       Diabetes mellitus with microalbuminuria (CMS/[facility])    Vitamin D deficiency    Relevant Orders    Vitamin D 25 hydroxy     Other Visit Diagnoses       Medicare annual wellness visit, subsequent    -  Primary    Encounter for immunization        Relevant Orders    Flu vaccine, quadrivalent, high-dose, preservative free, age 65y+ (FLUZONE) (Completed)          We will obtain fasting blood work.  Results will be communicated to the patient via MyChart or a letter.   We reviewed appropriate preventative health screening guidelines. The patient is not up-to-date tetanus vaccine and shingles vaccines.   We discussed regular aerobic exercise. We discussed proper nutrition and weight control.    Flu vaccine given today

## 2023-09-08 NOTE — PATIENT INSTRUCTIONS
Recommendations for women annual wellness exam:   Make sure screenings for cervical, breast, and colon cancer are up to date if applicable- pap smears age 21-65, discuss mammogram starting at age 40, colonoscopy at age 45, earlier if positive family history for breast or colon cancer   Screen for osteoporosis with DXA bone scan starting at age 65 or sooner if risk factors present (long term steroid use, smoking, heavy alcohol use, history of fracture, rheumatoid arthritis, low body weight, family history of hip fracture)  Screening for lung cancer with low-dose CT in all adults age 55-77 who have a 30 pack-year smoking history and currently smoke or have quit within the past 15 years  Follow a healthy diet (Dash diet, Mediterranean diet)  Exercise 150 min/wk   Maintain healthy weight (BMI < 25)   Do not smoke   Alcohol in moderation (up to 1 drink/day)  Get enough sleep (7-8 hours/night)  Make sure immunizations are up to date (influenza, pneumococcal, Tdap, shingles if age > 50)  Postmenopausal women or women with osteoporosis need minimum 1,200 mg calcium and 800 IU vitamin D daily  Talk to your physician if you have concerns about depression or anxiety  Visit dentist twice yearly    Flu shot given today    We will check vitamin D and diabetes blood work  We will follow-up in 3 months

## 2023-09-09 LAB
ESTIMATED AVERAGE GLUCOSE FOR HBA1C: 174 MG/DL
HEMOGLOBIN A1C/HEMOGLOBIN TOTAL IN BLOOD: 7.7 %

## 2023-09-13 ENCOUNTER — TELEPHONE (OUTPATIENT)
Dept: PRIMARY CARE | Facility: CLINIC | Age: 88
End: 2023-09-13
Payer: MEDICARE

## 2023-11-15 DIAGNOSIS — E11.29 TYPE 2 DIABETES MELLITUS WITH OTHER DIABETIC KIDNEY COMPLICATION (MULTI): ICD-10-CM

## 2023-11-15 DIAGNOSIS — R80.9 PROTEINURIA, UNSPECIFIED: ICD-10-CM

## 2023-11-18 RX ORDER — LISINOPRIL 5 MG/1
TABLET ORAL
Qty: 90 TABLET | Refills: 1 | Status: SHIPPED | OUTPATIENT
Start: 2023-11-18 | End: 2024-05-13

## 2023-12-13 ENCOUNTER — OFFICE VISIT (OUTPATIENT)
Dept: PRIMARY CARE | Facility: CLINIC | Age: 88
End: 2023-12-13
Payer: MEDICARE

## 2023-12-13 VITALS
OXYGEN SATURATION: 95 % | BODY MASS INDEX: 31.26 KG/M2 | HEART RATE: 86 BPM | WEIGHT: 182 LBS | SYSTOLIC BLOOD PRESSURE: 110 MMHG | DIASTOLIC BLOOD PRESSURE: 52 MMHG | TEMPERATURE: 97.1 F

## 2023-12-13 DIAGNOSIS — E78.2 MIXED HYPERLIPIDEMIA: ICD-10-CM

## 2023-12-13 DIAGNOSIS — E11.22 CKD STAGE 3 SECONDARY TO DIABETES (MULTI): ICD-10-CM

## 2023-12-13 DIAGNOSIS — E03.9 HYPOTHYROIDISM, UNSPECIFIED TYPE: ICD-10-CM

## 2023-12-13 DIAGNOSIS — I10 PRIMARY HYPERTENSION: ICD-10-CM

## 2023-12-13 DIAGNOSIS — R80.9 DIABETES MELLITUS WITH MICROALBUMINURIA (MULTI): Primary | ICD-10-CM

## 2023-12-13 DIAGNOSIS — E11.29 DIABETES MELLITUS WITH MICROALBUMINURIA (MULTI): Primary | ICD-10-CM

## 2023-12-13 DIAGNOSIS — N18.30 CKD STAGE 3 SECONDARY TO DIABETES (MULTI): ICD-10-CM

## 2023-12-13 DIAGNOSIS — E55.9 VITAMIN D DEFICIENCY: ICD-10-CM

## 2023-12-13 LAB — POC HEMOGLOBIN A1C: 7.8 % (ref 4.2–6.5)

## 2023-12-13 PROCEDURE — 1126F AMNT PAIN NOTED NONE PRSNT: CPT | Performed by: STUDENT IN AN ORGANIZED HEALTH CARE EDUCATION/TRAINING PROGRAM

## 2023-12-13 PROCEDURE — 83036 HEMOGLOBIN GLYCOSYLATED A1C: CPT | Performed by: STUDENT IN AN ORGANIZED HEALTH CARE EDUCATION/TRAINING PROGRAM

## 2023-12-13 PROCEDURE — 3074F SYST BP LT 130 MM HG: CPT | Performed by: STUDENT IN AN ORGANIZED HEALTH CARE EDUCATION/TRAINING PROGRAM

## 2023-12-13 PROCEDURE — 1159F MED LIST DOCD IN RCRD: CPT | Performed by: STUDENT IN AN ORGANIZED HEALTH CARE EDUCATION/TRAINING PROGRAM

## 2023-12-13 PROCEDURE — 99214 OFFICE O/P EST MOD 30 MIN: CPT | Performed by: STUDENT IN AN ORGANIZED HEALTH CARE EDUCATION/TRAINING PROGRAM

## 2023-12-13 PROCEDURE — 1036F TOBACCO NON-USER: CPT | Performed by: STUDENT IN AN ORGANIZED HEALTH CARE EDUCATION/TRAINING PROGRAM

## 2023-12-13 PROCEDURE — 1160F RVW MEDS BY RX/DR IN RCRD: CPT | Performed by: STUDENT IN AN ORGANIZED HEALTH CARE EDUCATION/TRAINING PROGRAM

## 2023-12-13 PROCEDURE — 3078F DIAST BP <80 MM HG: CPT | Performed by: STUDENT IN AN ORGANIZED HEALTH CARE EDUCATION/TRAINING PROGRAM

## 2023-12-13 ASSESSMENT — ENCOUNTER SYMPTOMS
COUGH: 0
CHILLS: 0
DYSPHORIC MOOD: 0
ABDOMINAL PAIN: 0
NUMBNESS: 0
DIZZINESS: 0
FATIGUE: 0
FEVER: 0
NAUSEA: 0
WHEEZING: 0
DYSURIA: 0
HEMATURIA: 0
SHORTNESS OF BREATH: 0
FREQUENCY: 0
PALPITATIONS: 0
HEADACHES: 0
CONSTIPATION: 0
DIARRHEA: 0
NERVOUS/ANXIOUS: 0

## 2023-12-13 NOTE — PATIENT INSTRUCTIONS
Will continue current medications and see you back in 6 months and have you do fasting blood work prior to coming

## 2023-12-13 NOTE — PROGRESS NOTES
Subjective   Patient ID: Azucena Nolen is a 89 y.o. female who presents for Diabetes (Follow up).    HPI   Azucena Nolen is an 89 y.o.  y.o. female who presents for follow up of diabetes. Current symptoms include: paresthesia of the feet. Patient denies hypoglycemia  and visual disturbances. Evaluation to date has included: fasting blood sugar, fasting lipid panel, and hemoglobin A1C. Home sugars: 146-174 . Current treatments: Jardiance 3 days a week, glimepiride 1 mg daily. Last dilated eye exam 2 years ago.      Hypertension. Current medications ACE Inhibitor. Home blood pressure readings: not doing. Salt intake and diet: salty foods: occasional eating out and salt shaker on table.  Associated signs and symptoms: none. Patient denies: blurred vision, chest pain, dyspnea, headache, and palpitations. Use of agents associated with hypertension: thyroid hormones. Medication compliance: taking as prescribed.     Does take 2 2000 unit vitamin D capsules a few times a week.     Review of Systems   Constitutional:  Negative for chills, fatigue and fever.   Respiratory:  Negative for cough, shortness of breath and wheezing.    Cardiovascular:  Negative for chest pain, palpitations and leg swelling.   Gastrointestinal:  Negative for abdominal pain, constipation, diarrhea and nausea.   Genitourinary:  Negative for dysuria, frequency, hematuria and urgency.   Neurological:  Negative for dizziness, numbness and headaches.   Psychiatric/Behavioral:  Negative for dysphoric mood. The patient is not nervous/anxious.        Objective   /60 (BP Location: Left arm, Patient Position: Sitting, BP Cuff Size: Adult)   Pulse 86   Temp 36.2 °C (97.1 °F) (Temporal)   Wt 82.6 kg (182 lb)   SpO2 95%   BMI 31.26 kg/m²     Physical Exam  Constitutional:       Appearance: Normal appearance.   HENT:      Head: Normocephalic and atraumatic.   Eyes:      Extraocular Movements: Extraocular movements intact.      Pupils: Pupils are equal,  round, and reactive to light.   Cardiovascular:      Rate and Rhythm: Normal rate and regular rhythm.      Heart sounds: Normal heart sounds. No murmur heard.  Pulmonary:      Effort: Pulmonary effort is normal.      Breath sounds: Normal breath sounds. No wheezing.   Abdominal:      General: Bowel sounds are normal.      Palpations: Abdomen is soft.      Tenderness: There is no abdominal tenderness. There is no guarding.   Musculoskeletal:         General: Normal range of motion.   Skin:     General: Skin is warm and dry.   Neurological:      General: No focal deficit present.      Mental Status: She is alert and oriented to person, place, and time.   Psychiatric:         Mood and Affect: Mood normal.         Behavior: Behavior normal.       Assessment/Plan   Problem List Items Addressed This Visit       CKD stage 3 secondary to diabetes (CMS/[facility])    Diabetes mellitus with microalbuminuria (CMS/[facility]) - Primary    Relevant Orders    Hemoglobin A1C    Hyperlipidemia    Relevant Orders    Lipid Panel    Comprehensive Metabolic Panel    CBC    Hypertension    Hypothyroidism    Relevant Orders    TSH with reflex to Free T4 if abnormal    Vitamin D deficiency    Relevant Orders    Vitamin D 25 hydroxy     Will have her continue her current medications.  Everything else appears to be staying stable.  We will see her back in 6 months And have her check fasting blood work prior to coming back.    A1c done in office today           Patient understands and agrees with treatment plan    Veronika Restrepo, DO   12/13/23

## 2024-02-07 ENCOUNTER — OFFICE VISIT (OUTPATIENT)
Dept: CARDIOLOGY | Facility: HOSPITAL | Age: 89
End: 2024-02-07
Payer: MEDICARE

## 2024-02-07 VITALS
WEIGHT: 180 LBS | HEART RATE: 84 BPM | HEIGHT: 64 IN | BODY MASS INDEX: 30.73 KG/M2 | SYSTOLIC BLOOD PRESSURE: 140 MMHG | DIASTOLIC BLOOD PRESSURE: 70 MMHG

## 2024-02-07 DIAGNOSIS — E78.00 PURE HYPERCHOLESTEROLEMIA: ICD-10-CM

## 2024-02-07 DIAGNOSIS — I10 PRIMARY HYPERTENSION: ICD-10-CM

## 2024-02-07 DIAGNOSIS — I48.0 PAROXYSMAL ATRIAL FIBRILLATION (MULTI): ICD-10-CM

## 2024-02-07 DIAGNOSIS — I25.10 CAD IN NATIVE ARTERY: Primary | ICD-10-CM

## 2024-02-07 PROCEDURE — 93010 ELECTROCARDIOGRAM REPORT: CPT | Performed by: INTERNAL MEDICINE

## 2024-02-07 PROCEDURE — 3077F SYST BP >= 140 MM HG: CPT | Performed by: INTERNAL MEDICINE

## 2024-02-07 PROCEDURE — 1159F MED LIST DOCD IN RCRD: CPT | Performed by: INTERNAL MEDICINE

## 2024-02-07 PROCEDURE — 1160F RVW MEDS BY RX/DR IN RCRD: CPT | Performed by: INTERNAL MEDICINE

## 2024-02-07 PROCEDURE — 1126F AMNT PAIN NOTED NONE PRSNT: CPT | Performed by: INTERNAL MEDICINE

## 2024-02-07 PROCEDURE — 1036F TOBACCO NON-USER: CPT | Performed by: INTERNAL MEDICINE

## 2024-02-07 PROCEDURE — 3078F DIAST BP <80 MM HG: CPT | Performed by: INTERNAL MEDICINE

## 2024-02-07 PROCEDURE — 1157F ADVNC CARE PLAN IN RCRD: CPT | Performed by: INTERNAL MEDICINE

## 2024-02-07 PROCEDURE — 99214 OFFICE O/P EST MOD 30 MIN: CPT | Performed by: INTERNAL MEDICINE

## 2024-02-07 PROCEDURE — 93005 ELECTROCARDIOGRAM TRACING: CPT | Performed by: INTERNAL MEDICINE

## 2024-02-07 ASSESSMENT — ENCOUNTER SYMPTOMS
LOSS OF SENSATION IN FEET: 0
OCCASIONAL FEELINGS OF UNSTEADINESS: 1
DEPRESSION: 0

## 2024-02-07 NOTE — PROGRESS NOTES
Primary Care Physician: Veronika Restrepo DO  Date of Visit: 02/07/2024  9:40 AM EST  Location of visit: [facility]     Chief Complaint:   Chief Complaint   Patient presents with    Coronary Artery Disease    Hyperlipidemia    Hypertension        HPI / Summary:   Azucena Nolen is a 89 y.o. female presents for followup.  She has no cardiac complaints.  She does use a seated elliptical machine for 10 to 15 minutes daily without chest pain or shortness of breath.  She has no change in her chronic dizziness.  She has not had any recent falls.  She denies any near-syncope or syncope.  The patient denies chest pain, shortness of breath, palpitations, syncope, orthopnea, paroxysmal nocturnal dyspnea, lower extremity edema, or bleeding problems.          Past Medical History:   Diagnosis Date    Atherosclerotic heart disease of native coronary artery without angina pectoris 07/27/2013    Arteriosclerotic cardiovascular disease (ASCVD)    Atherosclerotic heart disease of native coronary artery without angina pectoris 08/30/2022    CAD in native artery    Atrioventricular block, complete (CMS/HCC) 11/16/2022    Third degree AV block    Atypical facial pain 04/22/2016    Atypical face pain    Disorder of thyroid, unspecified     Thyroid trouble    Dizziness and giddiness 11/19/2018    Light-headedness    Hyperlipidemia, unspecified 08/30/2022    Hyperlipidemia    Hypothyroidism, unspecified 11/18/2021    Hypothyroidism    Impacted cerumen, right ear 08/29/2019    Excessive cerumen in right ear canal    Localized swelling, mass and lump, head 04/27/2016    Facial mass    Localized swelling, mass and lump, neck 04/21/2016    Mass in neck    Other conditions influencing health status     Right handed    Other specified cough 04/19/2017    Cough with sputum    Other specified disorders of eustachian tube, left ear 09/29/2016    Dysfunction of left eustachian tube    Other specified problems related to primary support group  12/05/2017    Stress due to family tension    Pain in right leg 10/23/2015    Leg pain, bilateral    Pain in unspecified hip 12/05/2017    Joint pain, hip    Pain in unspecified knee 12/05/2017    Knee pain    Personal history of other diseases of the circulatory system     History of cardiac disorder    Personal history of other diseases of the respiratory system 04/03/2017    History of acute bronchitis    Personal history of other diseases of urinary system 09/06/2019    History of hematuria    Personal history of other specified conditions 04/27/2016    History of facial pain    Personal history of other specified conditions 04/26/2017    History of fatigue    Personal history of other specified conditions 07/30/2019    History of dysuria    Personal history of other specified conditions 09/06/2019    History of gross hematuria    Presence of cardiac pacemaker 07/23/2020    Presence of cardiac pacemaker    Rash and other nonspecific skin eruption 04/21/2016    Localized rash    Spontaneous ecchymoses     Bruises easily    Trochanteric bursitis, left hip 04/16/2018    Greater trochanteric bursitis of left hip    Urinary tract infection, site not specified 06/25/2020    Acute UTI    Urinary tract infection, site not specified 06/25/2020    Acute UTI        Past Surgical History:   Procedure Laterality Date    CARDIAC PACEMAKER PLACEMENT  04/10/2014    Pacemaker Placement    CATARACT EXTRACTION  11/17/2014    Cataract Surgery    CORONARY ANGIOPLASTY WITH STENT PLACEMENT  04/17/2014    Cath Stent Placement    OTHER SURGICAL HISTORY  08/30/2022    Hypertension          Social History:   reports that she quit smoking about 71 years ago. Her smoking use included cigarettes. She has a 5.00 pack-year smoking history. She has been exposed to tobacco smoke. She has never used smokeless tobacco. She reports current alcohol use of about 1.0 standard drink of alcohol per week. She reports that she does not use drugs.  "    Family History:  family history includes Cancer in her father and another family member; Diabetes in an other family member; Heart attack in her brother; Heart disease in her brother; Pancreatic cancer in her father.      Allergies:  No Known Allergies    Outpatient Medications:  Current Outpatient Medications   Medication Instructions    ALPRAZolam (XANAX) 0.25-0.5 mg, oral, Nightly PRN    apixaban (Eliquis) 2.5 mg tablet 1 tablet, oral, 2 times daily    aspirin 81 mg EC tablet 1 tablet, oral, Daily    glimepiride (AMARYL) 1 mg, oral, Daily before breakfast    levothyroxine (SYNTHROID, LEVOXYL) 50 mcg, oral, Daily    lisinopril 5 mg tablet TAKE 1 TABLET BY MOUTH EVERY DAY    meloxicam (MOBIC) 15 mg, oral, 2 times weekly, Twice weekly prn    omeprazole OTC (PriLOSEC OTC) 20 mg EC tablet oral, As needed    rosuvastatin (Crestor) 40 mg tablet 1 tablet, oral, Daily    SITagliptin phosphate (JANUVIA) 50 mg, oral, Daily       Physical Exam:  Vitals:    02/07/24 1001   BP: 140/70   BP Location: Left arm   Patient Position: Sitting   BP Cuff Size: Adult   Pulse: 84   Weight: 81.6 kg (180 lb)   Height: 1.626 m (5' 4\")     Wt Readings from Last 5 Encounters:   02/07/24 81.6 kg (180 lb)   12/13/23 82.6 kg (182 lb)   09/08/23 80.5 kg (177 lb 6.4 oz)   08/29/23 80.9 kg (178 lb 5 oz)   05/26/23 78.8 kg (173 lb 12.8 oz)     Body mass index is 30.9 kg/m².   General: Well-developed well-nourished in no acute distress  HEENT: Normocephalic atraumatic  Neck: Supple, JVP is normal negative hepatojugular reflux 2+ carotid pulses without bruit  Pulmonary: Normal respiratory effort, clear to auscultation  Cardiovascular: No right ventricular heave, normal S1 and S2, no murmurs rubs or gallops  Abdomen: Soft nontender nondistended  Extremities: Warm without edema 2+ radial pulses bilaterally 2+ dorsalis pedis pulses bilaterally  Neurologic: Alert and oriented x3  Psychiatric: Normal mood and affect     Last Labs:  CMP:  Recent Labs    "  09/08/23  1132 05/15/23  0939 01/31/23  1011    141 139   K 4.1 4.0 4.6    107 102   CO2 26 23 24   ANIONGAP 11 15 18   BUN 24* 18 20   CREATININE 1.32* 1.35* 1.66*   GLUCOSE 137* 185* 211*     Recent Labs     05/15/23  0939 03/04/22  1010 02/12/21  0934   ALBUMIN 4.0 4.2 4.5   ALKPHOS 74 85 82   ALT 23 22 26   AST 20 18 17   BILITOT 0.4 0.5 0.4     CBC:  Recent Labs     05/15/23  0939 01/31/23  1011 05/19/22  1129   WBC 10.0 9.9 10.2   HGB 12.4 12.9 12.3   HCT 39.0 39.3 39.1    195 186   MCV 96 94 99     COAG:   Recent Labs     11/03/19  1245 09/22/19  0529   INR 1.5* 1.3*     HEME/ENDO:  Recent Labs     12/13/23  1030 09/08/23  1132 05/24/23  1425 05/15/23  0939 05/31/22  0938 05/19/22  1129 12/15/21  1144 02/12/21  0935 08/19/20  0948 01/28/20  1036   FERRITIN  --   --   --   --   --   --   --   --   --  56   IRONSAT  --   --   --   --   --   --   --   --   --  18*   TSH  --   --  2.92  --   --  1.84  --  3.39  --   --    HGBA1C 7.8* 7.7*  --  8.1*   < >  --    < >  --    < >  --     < > = values in this interval not displayed.      CARDIAC:   Recent Labs     08/03/21  1519 07/14/20  1326 01/28/20  1034    154 145     Recent Labs     05/15/23  0939 05/19/22  1129 03/04/22  1010   CHOL 116 132 119   LDLF 35 50 31   HDL 48.4 50.1 52.2   TRIG 161* 162* 180*       Last Cardiology Tests:  ECG:  An electrocardiogram performed today that I reviewed shows sinus rhythm with ventricular pacing.    Echo:  Echocardiogram February 17, 2021  CONCLUSIONS:   1. The left ventricular systolic function is normal with a 65-70% estimated ejection fraction.   2. Spectral Doppler shows an impaired relaxation pattern of left ventricular diastolic filling.   3. The left ventricular cavity size is decreased.       Cath:      Stress Test:  Exercise nuclear stress test May 2017  Summary:   1. No clinical or electrocardiographic evidence for ischemia at a maximal workload.   2. Nuclear image results are reported  separately.   3. The adequate level of stress was achieved.  IMPRESSION:  1. Normal stress myocardial perfusion imaging.  2. Well-maintained left ventricular function.         Cardiac Imaging:        Assessment/Plan   Diagnoses and all orders for this visit:  CAD in native artery  Paroxysmal atrial fibrillation (CMS/HCC)  Pure hypercholesterolemia  Primary hypertension    In summary, Ms. Nolen is a pleasant 89 year-old white female with a past medical history significant for coronary artery disease status post PCI, complete heart block status post permanent pacemaker placement, hyperlipidemia, hypertension, type II non-insulin-requiring diabetes, and paroxysmal atrial fibrillation.  She is asymptomatic from a cardiac perspective.  She appears euvolemic on exam.  Her creatinine has improved slightly.  Given her age, CKD, and need for aspirin we will keep her on Eliquis 2.5 mg for now.  She is scheduled to have labs with her primary physician.  She should continue her current cardiovascular medications.  We will see her back in follow-up in 6 months.      Orders:  No orders of the defined types were placed in this encounter.     Followup Appts:  Future Appointments   Date Time Provider Department Center   6/4/2024  8:30 AM Veronika Restrpeo DO [id number] [facility]           ____________________________________________________________  Jose Angel Pace MD  [facility]  [facility]

## 2024-02-26 ENCOUNTER — TELEPHONE (OUTPATIENT)
Dept: CARDIOLOGY | Facility: HOSPITAL | Age: 89
End: 2024-02-26
Payer: MEDICARE

## 2024-02-26 LAB
ATRIAL RATE: 84 BPM
P AXIS: 46 DEGREES
P OFFSET: 180 MS
P ONSET: 113 MS
PR INTERVAL: 158 MS
Q ONSET: 192 MS
QRS COUNT: 14 BEATS
QRS DURATION: 146 MS
QT INTERVAL: 434 MS
QTC CALCULATION(BAZETT): 512 MS
QTC FREDERICIA: 485 MS
R AXIS: 270 DEGREES
T AXIS: 70 DEGREES
T OFFSET: 409 MS
VENTRICULAR RATE: 84 BPM

## 2024-02-26 NOTE — TELEPHONE ENCOUNTER
Patient called to say she has recently been noticing her afib more at night. Feels like a vibration? She isn't sure if its something she should be concerned about.

## 2024-02-27 ENCOUNTER — HOSPITAL ENCOUNTER (OUTPATIENT)
Dept: CARDIOLOGY | Facility: CLINIC | Age: 89
Discharge: HOME | End: 2024-02-27
Payer: MEDICARE

## 2024-02-27 DIAGNOSIS — I49.5 SINOATRIAL NODE DYSFUNCTION (MULTI): ICD-10-CM

## 2024-02-27 DIAGNOSIS — I48.0 PAROXYSMAL ATRIAL FIBRILLATION (MULTI): ICD-10-CM

## 2024-02-27 DIAGNOSIS — Z95.0 PRESENCE OF CARDIAC PACEMAKER: ICD-10-CM

## 2024-02-27 PROCEDURE — 93294 REM INTERROG EVL PM/LDLS PM: CPT | Performed by: INTERNAL MEDICINE

## 2024-02-27 PROCEDURE — 93296 REM INTERROG EVL PM/IDS: CPT

## 2024-02-28 DIAGNOSIS — I49.5 SINOATRIAL NODE DYSFUNCTION (MULTI): ICD-10-CM

## 2024-02-28 DIAGNOSIS — Z95.0 PRESENCE OF CARDIAC PACEMAKER: ICD-10-CM

## 2024-02-28 DIAGNOSIS — I48.0 PAROXYSMAL ATRIAL FIBRILLATION (MULTI): Primary | ICD-10-CM

## 2024-02-28 NOTE — TELEPHONE ENCOUNTER
Spoke with patient. Symptoms have resolved. Per [facility], no Afib.     Instructed patient to call with any additional or concerning symptoms.

## 2024-03-18 ENCOUNTER — HOSPITAL ENCOUNTER (OUTPATIENT)
Dept: CARDIOLOGY | Facility: CLINIC | Age: 89
Discharge: HOME | End: 2024-03-18
Payer: MEDICARE

## 2024-03-18 DIAGNOSIS — I49.5 SINOATRIAL NODE DYSFUNCTION (MULTI): ICD-10-CM

## 2024-03-18 DIAGNOSIS — I48.0 PAROXYSMAL ATRIAL FIBRILLATION (MULTI): ICD-10-CM

## 2024-03-18 DIAGNOSIS — Z95.0 PRESENCE OF CARDIAC PACEMAKER: ICD-10-CM

## 2024-03-18 PROCEDURE — 93296 REM INTERROG EVL PM/IDS: CPT

## 2024-03-18 PROCEDURE — 93294 REM INTERROG EVL PM/LDLS PM: CPT | Performed by: INTERNAL MEDICINE

## 2024-03-27 ENCOUNTER — TELEPHONE (OUTPATIENT)
Dept: PRIMARY CARE | Facility: CLINIC | Age: 89
End: 2024-03-27
Payer: MEDICARE

## 2024-03-27 NOTE — TELEPHONE ENCOUNTER
Called and spoke to patient.   Let her know Dr. Restrepo's message and  recommendation also advised that if she develops an uncontrollable cough or shortness of breath to go to the ED.   Patient agreed and understood message.

## 2024-04-04 ENCOUNTER — TELEPHONE (OUTPATIENT)
Dept: PRIMARY CARE | Facility: CLINIC | Age: 89
End: 2024-04-04
Payer: MEDICARE

## 2024-04-04 NOTE — TELEPHONE ENCOUNTER
Denise from [facility] health called and left message.   Stated that pt was referred by shannan mata. Since she was diagnosed with covid. They wanted to know if she has been seen within the last 90 days and if so they need a verbal if you are willing to sign and follow for home care orders specifically skilled nursing, PT and OT.  FAX: [phone]  Call back # [phone]    Looks like last time you saw her is 12/13/23 which is past 90 days.    Please review and advise.

## 2024-04-09 NOTE — TELEPHONE ENCOUNTER
Called and spoke to Denise from home health and relayed the message.  Per dr. Restrepo will follow and sign for homecare orders. And that we can set up a VV if needed, but has appointment in June. Denise said she will follow up with patient to see what they would like to do, or if it is necessary.

## 2024-04-15 ENCOUNTER — HOSPITAL ENCOUNTER (OUTPATIENT)
Dept: CARDIOLOGY | Facility: CLINIC | Age: 89
Discharge: HOME | End: 2024-04-15
Payer: MEDICARE

## 2024-04-15 DIAGNOSIS — I44.2 ATRIOVENTRICULAR BLOCK, COMPLETE (MULTI): ICD-10-CM

## 2024-04-15 DIAGNOSIS — Z95.0 PRESENCE OF CARDIAC PACEMAKER: ICD-10-CM

## 2024-05-12 DIAGNOSIS — E11.29 TYPE 2 DIABETES MELLITUS WITH OTHER DIABETIC KIDNEY COMPLICATION (MULTI): ICD-10-CM

## 2024-05-12 DIAGNOSIS — R80.9 PROTEINURIA, UNSPECIFIED: ICD-10-CM

## 2024-05-13 RX ORDER — LISINOPRIL 5 MG/1
TABLET ORAL
Qty: 90 TABLET | Refills: 1 | Status: SHIPPED | OUTPATIENT
Start: 2024-05-13

## 2024-05-20 DIAGNOSIS — E03.9 HYPOTHYROIDISM, UNSPECIFIED TYPE: ICD-10-CM

## 2024-05-22 RX ORDER — LEVOTHYROXINE SODIUM 50 UG/1
50 TABLET ORAL DAILY
Qty: 30 TABLET | Refills: 0 | Status: SHIPPED | OUTPATIENT
Start: 2024-05-22 | End: 2024-06-04 | Stop reason: SDUPTHER

## 2024-05-28 ENCOUNTER — LAB (OUTPATIENT)
Dept: LAB | Facility: LAB | Age: 89
End: 2024-05-28
Payer: MEDICARE

## 2024-05-28 DIAGNOSIS — R80.9 DIABETES MELLITUS WITH MICROALBUMINURIA (MULTI): ICD-10-CM

## 2024-05-28 DIAGNOSIS — E11.29 DIABETES MELLITUS WITH MICROALBUMINURIA (MULTI): ICD-10-CM

## 2024-05-28 DIAGNOSIS — E78.2 MIXED HYPERLIPIDEMIA: ICD-10-CM

## 2024-05-28 DIAGNOSIS — E55.9 VITAMIN D DEFICIENCY: ICD-10-CM

## 2024-05-28 DIAGNOSIS — E03.9 HYPOTHYROIDISM, UNSPECIFIED TYPE: ICD-10-CM

## 2024-05-28 LAB
25(OH)D3 SERPL-MCNC: 33 NG/ML (ref 30–100)
ALBUMIN SERPL BCP-MCNC: 4.3 G/DL (ref 3.4–5)
ALP SERPL-CCNC: 57 U/L (ref 33–136)
ALT SERPL W P-5'-P-CCNC: 20 U/L (ref 7–45)
ANION GAP SERPL CALC-SCNC: 13 MMOL/L (ref 10–20)
AST SERPL W P-5'-P-CCNC: 18 U/L (ref 9–39)
BILIRUB SERPL-MCNC: 0.5 MG/DL (ref 0–1.2)
BUN SERPL-MCNC: 22 MG/DL (ref 6–23)
CALCIUM SERPL-MCNC: 9.2 MG/DL (ref 8.6–10.3)
CHLORIDE SERPL-SCNC: 105 MMOL/L (ref 98–107)
CHOLEST SERPL-MCNC: 134 MG/DL (ref 0–199)
CHOLESTEROL/HDL RATIO: 2.7
CO2 SERPL-SCNC: 25 MMOL/L (ref 21–32)
CREAT SERPL-MCNC: 1.55 MG/DL (ref 0.5–1.05)
EGFRCR SERPLBLD CKD-EPI 2021: 31 ML/MIN/1.73M*2
ERYTHROCYTE [DISTWIDTH] IN BLOOD BY AUTOMATED COUNT: 13 % (ref 11.5–14.5)
GLUCOSE SERPL-MCNC: 179 MG/DL (ref 74–99)
HCT VFR BLD AUTO: 39.3 % (ref 36–46)
HDLC SERPL-MCNC: 49.3 MG/DL
HGB BLD-MCNC: 12.6 G/DL (ref 12–16)
LDLC SERPL CALC-MCNC: 46 MG/DL
MCH RBC QN AUTO: 30.6 PG (ref 26–34)
MCHC RBC AUTO-ENTMCNC: 32.1 G/DL (ref 32–36)
MCV RBC AUTO: 95 FL (ref 80–100)
NON HDL CHOLESTEROL: 85 MG/DL (ref 0–149)
NRBC BLD-RTO: 0 /100 WBCS (ref 0–0)
PLATELET # BLD AUTO: 157 X10*3/UL (ref 150–450)
POTASSIUM SERPL-SCNC: 4.2 MMOL/L (ref 3.5–5.3)
PROT SERPL-MCNC: 7.1 G/DL (ref 6.4–8.2)
RBC # BLD AUTO: 4.12 X10*6/UL (ref 4–5.2)
SODIUM SERPL-SCNC: 139 MMOL/L (ref 136–145)
TRIGL SERPL-MCNC: 194 MG/DL (ref 0–149)
TSH SERPL-ACNC: 2.27 MIU/L (ref 0.44–3.98)
VLDL: 39 MG/DL (ref 0–40)
WBC # BLD AUTO: 10.1 X10*3/UL (ref 4.4–11.3)

## 2024-05-28 PROCEDURE — 80061 LIPID PANEL: CPT

## 2024-05-28 PROCEDURE — 36415 COLL VENOUS BLD VENIPUNCTURE: CPT

## 2024-05-28 PROCEDURE — 85027 COMPLETE CBC AUTOMATED: CPT

## 2024-05-28 PROCEDURE — 83036 HEMOGLOBIN GLYCOSYLATED A1C: CPT

## 2024-05-28 PROCEDURE — 82306 VITAMIN D 25 HYDROXY: CPT

## 2024-05-28 PROCEDURE — 80053 COMPREHEN METABOLIC PANEL: CPT

## 2024-05-28 PROCEDURE — 84443 ASSAY THYROID STIM HORMONE: CPT

## 2024-05-29 LAB
EST. AVERAGE GLUCOSE BLD GHB EST-MCNC: 186 MG/DL
HBA1C MFR BLD: 8.1 %

## 2024-06-04 ENCOUNTER — OFFICE VISIT (OUTPATIENT)
Dept: PRIMARY CARE | Facility: CLINIC | Age: 89
End: 2024-06-04
Payer: MEDICARE

## 2024-06-04 VITALS
DIASTOLIC BLOOD PRESSURE: 60 MMHG | HEART RATE: 68 BPM | OXYGEN SATURATION: 97 % | TEMPERATURE: 97.5 F | SYSTOLIC BLOOD PRESSURE: 112 MMHG | WEIGHT: 178.2 LBS | BODY MASS INDEX: 30.59 KG/M2

## 2024-06-04 DIAGNOSIS — I10 PRIMARY HYPERTENSION: ICD-10-CM

## 2024-06-04 DIAGNOSIS — E11.29 DIABETES MELLITUS WITH MICROALBUMINURIA (MULTI): Primary | ICD-10-CM

## 2024-06-04 DIAGNOSIS — E78.00 PURE HYPERCHOLESTEROLEMIA: ICD-10-CM

## 2024-06-04 DIAGNOSIS — E03.9 HYPOTHYROIDISM, UNSPECIFIED TYPE: ICD-10-CM

## 2024-06-04 DIAGNOSIS — E11.22 CKD STAGE 3 SECONDARY TO DIABETES (MULTI): ICD-10-CM

## 2024-06-04 DIAGNOSIS — R80.9 DIABETES MELLITUS WITH MICROALBUMINURIA (MULTI): Primary | ICD-10-CM

## 2024-06-04 DIAGNOSIS — N18.30 CKD STAGE 3 SECONDARY TO DIABETES (MULTI): ICD-10-CM

## 2024-06-04 PROCEDURE — 99214 OFFICE O/P EST MOD 30 MIN: CPT | Performed by: STUDENT IN AN ORGANIZED HEALTH CARE EDUCATION/TRAINING PROGRAM

## 2024-06-04 PROCEDURE — 1160F RVW MEDS BY RX/DR IN RCRD: CPT | Performed by: STUDENT IN AN ORGANIZED HEALTH CARE EDUCATION/TRAINING PROGRAM

## 2024-06-04 PROCEDURE — 1157F ADVNC CARE PLAN IN RCRD: CPT | Performed by: STUDENT IN AN ORGANIZED HEALTH CARE EDUCATION/TRAINING PROGRAM

## 2024-06-04 PROCEDURE — 3078F DIAST BP <80 MM HG: CPT | Performed by: STUDENT IN AN ORGANIZED HEALTH CARE EDUCATION/TRAINING PROGRAM

## 2024-06-04 PROCEDURE — 1159F MED LIST DOCD IN RCRD: CPT | Performed by: STUDENT IN AN ORGANIZED HEALTH CARE EDUCATION/TRAINING PROGRAM

## 2024-06-04 PROCEDURE — 3074F SYST BP LT 130 MM HG: CPT | Performed by: STUDENT IN AN ORGANIZED HEALTH CARE EDUCATION/TRAINING PROGRAM

## 2024-06-04 RX ORDER — ACETAMINOPHEN 500 MG
5000 TABLET ORAL AS NEEDED
COMMUNITY

## 2024-06-04 RX ORDER — GLIMEPIRIDE 1 MG/1
1 TABLET ORAL
Qty: 90 TABLET | Refills: 3 | Status: SHIPPED | OUTPATIENT
Start: 2024-06-04 | End: 2025-06-04

## 2024-06-04 RX ORDER — ROSUVASTATIN CALCIUM 40 MG/1
40 TABLET, COATED ORAL DAILY
Qty: 90 TABLET | Refills: 3 | Status: SHIPPED | OUTPATIENT
Start: 2024-06-04

## 2024-06-04 RX ORDER — LEVOTHYROXINE SODIUM 50 UG/1
50 TABLET ORAL DAILY
Qty: 90 TABLET | Refills: 3 | Status: SHIPPED | OUTPATIENT
Start: 2024-06-04

## 2024-06-04 ASSESSMENT — ENCOUNTER SYMPTOMS
FEVER: 0
SHORTNESS OF BREATH: 0
NAUSEA: 0
DYSPHORIC MOOD: 0
DIARRHEA: 0
WHEEZING: 0
DYSURIA: 0
DEPRESSION: 1
CONSTIPATION: 0
ABDOMINAL PAIN: 0
FREQUENCY: 0
PALPITATIONS: 0
NUMBNESS: 0
COUGH: 0
CHILLS: 0
OCCASIONAL FEELINGS OF UNSTEADINESS: 1
DIZZINESS: 0
NERVOUS/ANXIOUS: 0
LOSS OF SENSATION IN FEET: 1
HEADACHES: 0
HEMATURIA: 0
FATIGUE: 0

## 2024-06-04 ASSESSMENT — ANXIETY QUESTIONNAIRES
1. FEELING NERVOUS, ANXIOUS, OR ON EDGE: NOT AT ALL
7. FEELING AFRAID AS IF SOMETHING AWFUL MIGHT HAPPEN: SEVERAL DAYS
5. BEING SO RESTLESS THAT IT IS HARD TO SIT STILL: NOT AT ALL
2. NOT BEING ABLE TO STOP OR CONTROL WORRYING: SEVERAL DAYS
IF YOU CHECKED OFF ANY PROBLEMS ON THIS QUESTIONNAIRE, HOW DIFFICULT HAVE THESE PROBLEMS MADE IT FOR YOU TO DO YOUR WORK, TAKE CARE OF THINGS AT HOME, OR GET ALONG WITH OTHER PEOPLE: NOT DIFFICULT AT ALL
3. WORRYING TOO MUCH ABOUT DIFFERENT THINGS: SEVERAL DAYS
4. TROUBLE RELAXING: NOT AT ALL
GAD7 TOTAL SCORE: 3
6. BECOMING EASILY ANNOYED OR IRRITABLE: NOT AT ALL

## 2024-06-04 ASSESSMENT — PATIENT HEALTH QUESTIONNAIRE - PHQ9
3. TROUBLE FALLING OR STAYING ASLEEP OR SLEEPING TOO MUCH: MORE THAN HALF THE DAYS
8. MOVING OR SPEAKING SO SLOWLY THAT OTHER PEOPLE COULD HAVE NOTICED. OR THE OPPOSITE, BEING SO FIGETY OR RESTLESS THAT YOU HAVE BEEN MOVING AROUND A LOT MORE THAN USUAL: NOT AT ALL
1. LITTLE INTEREST OR PLEASURE IN DOING THINGS: NOT AT ALL
10. IF YOU CHECKED OFF ANY PROBLEMS, HOW DIFFICULT HAVE THESE PROBLEMS MADE IT FOR YOU TO DO YOUR WORK, TAKE CARE OF THINGS AT HOME, OR GET ALONG WITH OTHER PEOPLE: NOT DIFFICULT AT ALL
4. FEELING TIRED OR HAVING LITTLE ENERGY: NOT AT ALL
9. THOUGHTS THAT YOU WOULD BE BETTER OFF DEAD, OR OF HURTING YOURSELF: NOT AT ALL
7. TROUBLE CONCENTRATING ON THINGS, SUCH AS READING THE NEWSPAPER OR WATCHING TELEVISION: NOT AT ALL
6. FEELING BAD ABOUT YOURSELF - OR THAT YOU ARE A FAILURE OR HAVE LET YOURSELF OR YOUR FAMILY DOWN: SEVERAL DAYS
SUM OF ALL RESPONSES TO PHQ QUESTIONS 1-9: 4
SUM OF ALL RESPONSES TO PHQ9 QUESTIONS 1 AND 2: 1
2. FEELING DOWN, DEPRESSED OR HOPELESS: SEVERAL DAYS
5. POOR APPETITE OR OVEREATING: NOT AT ALL

## 2024-06-04 NOTE — PROGRESS NOTES
Subjective   Patient ID: Azucena Nolen is a 89 y.o. female who presents for Diabetes (Follow up).    Diabetes  Pertinent negatives for hypoglycemia include no dizziness, headaches or nervousness/anxiousness. Pertinent negatives for diabetes include no chest pain and no fatigue.      Follow up of diabetes.  Current treatments: Januvia 3 times a week, glimepiride 1 mg daily. Current symptoms include: paresthesia of the feet. Patient denies hypoglycemia  and visual disturbances. Home sugars: checks sometimes, last night it was 217.  Statin: Yes rosuvastatin ACE/ARB: Yes lisinopril.  Blood work from last week showed an A1c of 8.1 up from 7.7 last September and 7.8 last December.  Blood sugar 179, creatinine 1.55 and GFR 31.  Normal TSH, vitamin D 33 up from 12 last September    Follow-up of  hypertension. Current medications ACE Inhibitor. Home blood pressure readings: not doing.   Associated signs and symptoms: none. Patient denies: blurred vision, chest pain, dyspnea, headache, and palpitations. Use of agents associated with hypertension: NSAIDS and thyroid hormones. Medication compliance: taking as prescribed.     Does take 2 2000 unit vitamin D capsules a few times a week.     Review of Systems   Constitutional:  Negative for chills, fatigue and fever.   Respiratory:  Negative for cough, shortness of breath and wheezing.    Cardiovascular:  Negative for chest pain, palpitations and leg swelling.   Gastrointestinal:  Negative for abdominal pain, constipation, diarrhea and nausea.   Genitourinary:  Negative for dysuria, frequency, hematuria and urgency.   Neurological:  Negative for dizziness, numbness and headaches.   Psychiatric/Behavioral:  Negative for dysphoric mood. The patient is not nervous/anxious.        Objective   /60 (BP Location: Left arm, Patient Position: Sitting, BP Cuff Size: Large adult)   Pulse 68   Temp 36.4 °C (97.5 °F) (Temporal)   Wt 80.8 kg (178 lb 3.2 oz)   SpO2 97%   BMI 30.59  kg/m²     Physical Exam  Constitutional:       Appearance: Normal appearance.   HENT:      Head: Normocephalic and atraumatic.   Eyes:      Extraocular Movements: Extraocular movements intact.      Pupils: Pupils are equal, round, and reactive to light.   Cardiovascular:      Rate and Rhythm: Normal rate and regular rhythm.      Heart sounds: Normal heart sounds. No murmur heard.  Pulmonary:      Effort: Pulmonary effort is normal.      Breath sounds: Normal breath sounds. No wheezing.   Abdominal:      General: Bowel sounds are normal.      Palpations: Abdomen is soft.      Tenderness: There is no abdominal tenderness. There is no guarding.   Musculoskeletal:         General: Normal range of motion.   Skin:     General: Skin is warm and dry.   Neurological:      General: No focal deficit present.      Mental Status: She is alert and oriented to person, place, and time.   Psychiatric:         Mood and Affect: Mood normal.         Behavior: Behavior normal.         Assessment/Plan   Problem List Items Addressed This Visit       CKD stage 3 secondary to diabetes (Multi)    Diabetes mellitus with microalbuminuria (Multi) - Primary    Relevant Medications    glimepiride (Amaryl) 1 mg tablet    Other Relevant Orders    Basic Metabolic Panel    Hemoglobin A1C    Hyperlipidemia    Relevant Medications    rosuvastatin (Crestor) 40 mg tablet    Hypertension    Hypothyroidism    Relevant Medications    levothyroxine (Synthroid, Levoxyl) 50 mcg tablet       Will have her continue her current medications.  Everything else appears to be staying stable.  We will see her back in 6 months And have her check fasting blood work prior to coming back.    A1c done in office today           Patient understands and agrees with treatment plan    Veronika Restrepo, DO   06/05/24

## 2024-07-15 DIAGNOSIS — M54.50 CHRONIC LOW BACK PAIN WITHOUT SCIATICA, UNSPECIFIED BACK PAIN LATERALITY: ICD-10-CM

## 2024-07-15 DIAGNOSIS — G89.29 CHRONIC LOW BACK PAIN WITHOUT SCIATICA, UNSPECIFIED BACK PAIN LATERALITY: ICD-10-CM

## 2024-07-16 RX ORDER — MELOXICAM 15 MG/1
TABLET ORAL
Qty: 24 TABLET | Refills: 7 | Status: SHIPPED | OUTPATIENT
Start: 2024-07-16

## 2024-08-13 ENCOUNTER — OFFICE VISIT (OUTPATIENT)
Dept: CARDIOLOGY | Facility: HOSPITAL | Age: 89
End: 2024-08-13
Payer: MEDICARE

## 2024-08-13 VITALS
BODY MASS INDEX: 29.88 KG/M2 | SYSTOLIC BLOOD PRESSURE: 140 MMHG | HEART RATE: 74 BPM | HEIGHT: 64 IN | DIASTOLIC BLOOD PRESSURE: 64 MMHG | WEIGHT: 175 LBS

## 2024-08-13 DIAGNOSIS — E78.5 HYPERLIPIDEMIA, UNSPECIFIED HYPERLIPIDEMIA TYPE: ICD-10-CM

## 2024-08-13 DIAGNOSIS — I10 PRIMARY HYPERTENSION: ICD-10-CM

## 2024-08-13 DIAGNOSIS — I25.10 CAD IN NATIVE ARTERY: Primary | ICD-10-CM

## 2024-08-13 DIAGNOSIS — I48.0 PAROXYSMAL ATRIAL FIBRILLATION (MULTI): ICD-10-CM

## 2024-08-13 LAB
ATRIAL RATE: 74 BPM
P AXIS: 93 DEGREES
P OFFSET: 166 MS
P ONSET: 111 MS
PR INTERVAL: 164 MS
Q ONSET: 193 MS
QRS COUNT: 12 BEATS
QRS DURATION: 150 MS
QT INTERVAL: 462 MS
QTC CALCULATION(BAZETT): 512 MS
QTC FREDERICIA: 495 MS
R AXIS: -87 DEGREES
T AXIS: 91 DEGREES
T OFFSET: 424 MS
VENTRICULAR RATE: 74 BPM

## 2024-08-13 PROCEDURE — 3077F SYST BP >= 140 MM HG: CPT | Performed by: NURSE PRACTITIONER

## 2024-08-13 PROCEDURE — 1157F ADVNC CARE PLAN IN RCRD: CPT | Performed by: NURSE PRACTITIONER

## 2024-08-13 PROCEDURE — 1036F TOBACCO NON-USER: CPT | Performed by: NURSE PRACTITIONER

## 2024-08-13 PROCEDURE — 3078F DIAST BP <80 MM HG: CPT | Performed by: NURSE PRACTITIONER

## 2024-08-13 PROCEDURE — 1159F MED LIST DOCD IN RCRD: CPT | Performed by: NURSE PRACTITIONER

## 2024-08-13 PROCEDURE — 99214 OFFICE O/P EST MOD 30 MIN: CPT | Performed by: NURSE PRACTITIONER

## 2024-08-13 PROCEDURE — G2211 COMPLEX E/M VISIT ADD ON: HCPCS | Performed by: NURSE PRACTITIONER

## 2024-08-13 PROCEDURE — 93005 ELECTROCARDIOGRAM TRACING: CPT | Performed by: NURSE PRACTITIONER

## 2024-08-13 NOTE — PROGRESS NOTES
Primary Care Physician: Veronika Restrepo DO  Date of Visit: 08/13/2024  9:00 AM EDT  Location of visit: [facility]     Chief Complaint:   Chief Complaint   Patient presents with    Follow-up        HPI / Summary:   Azucena Nolen is a 89 y.o. female presents for followup. She has no cardiac complaints. She has not been exercising on the seated elliptical. She is able to do daily activity without chest pain or dyspnea. She has noticed palpitations over the past couple months typically occurring before she gets out of bed. Described as a thumping sensation. Occurs sporadically.  She has no change in her chronic dizziness.  She has not had any recent falls.  She denies any near-syncope or syncope. She had one fall after she accidentally rolled out of bed.   The patient denies chest pain, shortness of breath, syncope, orthopnea, paroxysmal nocturnal dyspnea, lower extremity edema, or bleeding problems.                Past Medical History:  Past Medical History:   Diagnosis Date    Atherosclerotic heart disease of native coronary artery without angina pectoris 07/27/2013    Arteriosclerotic cardiovascular disease (ASCVD)    Atherosclerotic heart disease of native coronary artery without angina pectoris 08/30/2022    CAD in native artery    Atrioventricular block, complete (Multi) 11/16/2022    Third degree AV block    Atypical facial pain 04/22/2016    Atypical face pain    Disorder of thyroid, unspecified     Thyroid trouble    Dizziness and giddiness 11/19/2018    Light-headedness    Hyperlipidemia, unspecified 08/30/2022    Hyperlipidemia    Hypothyroidism, unspecified 11/18/2021    Hypothyroidism    Impacted cerumen, right ear 08/29/2019    Excessive cerumen in right ear canal    Localized swelling, mass and lump, head 04/27/2016    Facial mass    Localized swelling, mass and lump, neck 04/21/2016    Mass in neck    Other conditions influencing health status     Right handed    Other specified cough  04/19/2017    Cough with sputum    Other specified disorders of eustachian tube, left ear 09/29/2016    Dysfunction of left eustachian tube    Other specified problems related to primary support group 12/05/2017    Stress due to family tension    Pain in right leg 10/23/2015    Leg pain, bilateral    Pain in unspecified hip 12/05/2017    Joint pain, hip    Pain in unspecified knee 12/05/2017    Knee pain    Personal history of other diseases of the circulatory system     History of cardiac disorder    Personal history of other diseases of the respiratory system 04/03/2017    History of acute bronchitis    Personal history of other diseases of urinary system 09/06/2019    History of hematuria    Personal history of other specified conditions 04/27/2016    History of facial pain    Personal history of other specified conditions 04/26/2017    History of fatigue    Personal history of other specified conditions 07/30/2019    History of dysuria    Personal history of other specified conditions 09/06/2019    History of gross hematuria    Presence of cardiac pacemaker 07/23/2020    Presence of cardiac pacemaker    Rash and other nonspecific skin eruption 04/21/2016    Localized rash    Spontaneous ecchymoses     Bruises easily    Trochanteric bursitis, left hip 04/16/2018    Greater trochanteric bursitis of left hip    Urinary tract infection, site not specified 06/25/2020    Acute UTI    Urinary tract infection, site not specified 06/25/2020    Acute UTI        Past Surgical History:  Past Surgical History:   Procedure Laterality Date    CARDIAC PACEMAKER PLACEMENT  04/10/2014    Pacemaker Placement    CATARACT EXTRACTION  11/17/2014    Cataract Surgery    CORONARY ANGIOPLASTY WITH STENT PLACEMENT  04/17/2014    Cath Stent Placement    OTHER SURGICAL HISTORY  08/30/2022    Hypertension          Social History:   reports that she quit smoking about 71 years ago. Her smoking use included cigarettes. She started smoking  "about 81 years ago. She has a 5 pack-year smoking history. She has been exposed to tobacco smoke. She has never used smokeless tobacco. She reports current alcohol use of about 1.0 standard drink of alcohol per week. She reports that she does not use drugs.     Family History:  family history includes Cancer in her father and another family member; Diabetes in an other family member; Heart attack in her brother; Heart disease in her brother; Pancreatic cancer in her father.      Allergies:  No Known Allergies    Outpatient Medications:  Current Outpatient Medications   Medication Instructions    ALPRAZolam (XANAX) 0.25-0.5 mg, oral, Nightly PRN    apixaban (Eliquis) 2.5 mg tablet 1 tablet, oral, 2 times daily    aspirin 81 mg EC tablet 1 tablet, oral, Daily    cholecalciferol (VITAMIN D-3) 5,000 Units, oral, Daily    glimepiride (AMARYL) 1 mg, oral, Daily before breakfast    levothyroxine (SYNTHROID, LEVOXYL) 50 mcg, oral, Daily    lisinopril 5 mg tablet TAKE 1 TABLET BY MOUTH EVERY DAY    meloxicam (Mobic) 15 mg tablet TAKE 1 TABLET (15 MG) BY MOUTH 2 TIMES A WEEK AS NEEDED.    omeprazole OTC (PriLOSEC OTC) 20 mg EC tablet oral, As needed    rosuvastatin (CRESTOR) 40 mg, oral, Daily    SITagliptin phosphate (JANUVIA) 50 mg, oral, Daily       Physical Exam:  Vitals:    08/13/24 0844   BP: 140/64   BP Location: Right arm   Pulse: 74   Weight: 79.4 kg (175 lb)   Height: 1.626 m (5' 4\")     Wt Readings from Last 5 Encounters:   08/13/24 79.4 kg (175 lb)   06/04/24 80.8 kg (178 lb 3.2 oz)   02/07/24 81.6 kg (180 lb)   12/13/23 82.6 kg (182 lb)   09/08/23 80.5 kg (177 lb 6.4 oz)     Body mass index is 30.04 kg/m².     GENERAL: alert, cooperative, pleasant, in no acute distress  SKIN: warm and dry  NECK: Normal JVD, negative HJR  CARDIAC: Regular rate and rhythm with no rubs, murmurs, or gallops  CHEST: Normal respiratory efforts, lungs clear to auscultation bilaterally.  ABDOMEN: soft, nontender, " nondistended  EXTREMITIES: no edema, +2 palpable RP bilaterally and +2 left DP and +1 right DP pulse    Last Labs:  Recent Labs     05/28/24  1049 05/15/23  0939 01/31/23  1011   WBC 10.1 10.0 9.9   HGB 12.6 12.4 12.9   HCT 39.3 39.0 39.3    162 195   MCV 95 96 94     Recent Labs     05/28/24  1049 09/08/23  1132 05/15/23  0939    140 141   K 4.2 4.1 4.0    107 107   BUN 22 24* 18   CREATININE 1.55* 1.32* 1.35*     CMP -  Lab Results   Component Value Date    CALCIUM 9.2 05/28/2024    PROT 7.1 05/28/2024    ALBUMIN 4.3 05/28/2024    AST 18 05/28/2024    ALT 20 05/28/2024    ALKPHOS 57 05/28/2024    BILITOT 0.5 05/28/2024       LIPID PANEL -   Lab Results   Component Value Date    CHOL 134 05/28/2024    HDL 49.3 05/28/2024    LDLF 35 05/15/2023    TRIG 194 (H) 05/28/2024   LDL 46 5/28/24    Lab Results   Component Value Date    HGBA1C 8.1 (H) 05/28/2024    HGBA1C 7.8 (A) 12/13/2023       Last Cardiology Tests:  ECG:  Obtained and reviewed EKG- normal sinus rhythm HR 74, LAD, non specific intraventricular block, possible lateral infarct, and inferior infarct    Echo:  Echocardiogram February 17, 2021  CONCLUSIONS:   1. The left ventricular systolic function is normal with a 65-70% estimated ejection fraction.   2. Spectral Doppler shows an impaired relaxation pattern of left ventricular diastolic filling.   3. The left ventricular cavity size is decreased.        Cath:        Stress Test:  Exercise nuclear stress test May 2017  Summary:   1. No clinical or electrocardiographic evidence for ischemia at a maximal workload.   2. Nuclear image results are reported separately.   3. The adequate level of stress was achieved.  IMPRESSION:  1. Normal stress myocardial perfusion imaging.  2. Well-maintained left ventricular function.           Cardiac Imaging:        Assessment/Plan   Problem List Items Addressed This Visit          Cardiac and Vasculature    AF (atrial fibrillation) (Multi)    Relevant  Orders    ECG 12 lead (Clinic Performed)    CAD in native artery - Primary    Hyperlipidemia    Hypertension     In summary, Ms. Nolen is a pleasant 89 year-old white female with a past medical history significant for coronary artery disease status post PCI, complete heart block status post permanent pacemaker placement, hyperlipidemia, hypertension, type II non-insulin-requiring diabetes, and paroxysmal atrial fibrillation.  Over the past couple months she has noticed palpitations described as a thumping sensation. I will ask the device clinic to get remote interrogation. She appears euvolemic on exam.  Given her age, CKD, and need for aspirin we will keep her on Eliquis 2.5 mg. She is scheduled to have labs with her primary physician.  She should continue her current cardiovascular medications.  We will see her back in follow-up in 6 months.       Orders:  Orders Placed This Encounter   Procedures    ECG 12 lead (Clinic Performed)      Followup Appts:  Future Appointments   Date Time Provider Department Center   9/9/2024  9:30 AM [id number]   9/30/2024  9:30 AM Veronika Restrepo DO [id number]           ____________________________________________________________  GRETEL Rodriguez-CNP  [facility]  [facility]

## 2024-08-13 NOTE — PATIENT INSTRUCTIONS
Remote device interrogation  Continue current cardiovascular medications  Blood work per primary care physician  Increase physical activity   Follow up in 6 months

## 2024-08-14 ENCOUNTER — HOSPITAL ENCOUNTER (OUTPATIENT)
Dept: CARDIOLOGY | Facility: CLINIC | Age: 89
Discharge: HOME | End: 2024-08-14
Payer: MEDICARE

## 2024-08-14 DIAGNOSIS — Z95.0 PRESENCE OF CARDIAC PACEMAKER: ICD-10-CM

## 2024-08-14 DIAGNOSIS — I48.0 PAROXYSMAL ATRIAL FIBRILLATION (MULTI): ICD-10-CM

## 2024-08-14 DIAGNOSIS — I49.5 SINOATRIAL NODE DYSFUNCTION (MULTI): ICD-10-CM

## 2024-08-14 PROCEDURE — 93296 REM INTERROG EVL PM/IDS: CPT

## 2024-09-09 ENCOUNTER — HOSPITAL ENCOUNTER (OUTPATIENT)
Dept: CARDIOLOGY | Facility: CLINIC | Age: 89
Discharge: HOME | End: 2024-09-09
Payer: MEDICARE

## 2024-09-09 DIAGNOSIS — Z95.0 PRESENCE OF CARDIAC PACEMAKER: ICD-10-CM

## 2024-09-09 DIAGNOSIS — I48.0 PAROXYSMAL ATRIAL FIBRILLATION (MULTI): ICD-10-CM

## 2024-09-09 DIAGNOSIS — I49.5 SINOATRIAL NODE DYSFUNCTION (MULTI): ICD-10-CM

## 2024-09-09 PROCEDURE — 93280 PM DEVICE PROGR EVAL DUAL: CPT

## 2024-09-25 ENCOUNTER — LAB (OUTPATIENT)
Dept: LAB | Facility: LAB | Age: 89
End: 2024-09-25
Payer: MEDICARE

## 2024-09-25 DIAGNOSIS — R80.9 DIABETES MELLITUS WITH MICROALBUMINURIA (MULTI): ICD-10-CM

## 2024-09-25 DIAGNOSIS — E11.29 DIABETES MELLITUS WITH MICROALBUMINURIA (MULTI): ICD-10-CM

## 2024-09-25 LAB
ANION GAP SERPL CALC-SCNC: 13 MMOL/L (ref 10–20)
BUN SERPL-MCNC: 24 MG/DL (ref 6–23)
CALCIUM SERPL-MCNC: 9.1 MG/DL (ref 8.6–10.3)
CHLORIDE SERPL-SCNC: 105 MMOL/L (ref 98–107)
CO2 SERPL-SCNC: 24 MMOL/L (ref 21–32)
CREAT SERPL-MCNC: 1.43 MG/DL (ref 0.5–1.05)
EGFRCR SERPLBLD CKD-EPI 2021: 34 ML/MIN/1.73M*2
GLUCOSE SERPL-MCNC: 183 MG/DL (ref 74–99)
POTASSIUM SERPL-SCNC: 4.3 MMOL/L (ref 3.5–5.3)
SODIUM SERPL-SCNC: 138 MMOL/L (ref 136–145)

## 2024-09-25 PROCEDURE — 36415 COLL VENOUS BLD VENIPUNCTURE: CPT

## 2024-09-25 PROCEDURE — 83036 HEMOGLOBIN GLYCOSYLATED A1C: CPT

## 2024-09-25 PROCEDURE — 80048 BASIC METABOLIC PNL TOTAL CA: CPT

## 2024-09-26 LAB
EST. AVERAGE GLUCOSE BLD GHB EST-MCNC: 194 MG/DL
HBA1C MFR BLD: 8.4 %

## 2024-09-29 ASSESSMENT — ENCOUNTER SYMPTOMS
NAUSEA: 0
PALPITATIONS: 0
DIARRHEA: 0
FATIGUE: 0
DYSURIA: 0
CONSTIPATION: 0
NUMBNESS: 0
COUGH: 0
SHORTNESS OF BREATH: 0
WHEEZING: 0
HEADACHES: 0
CHILLS: 0
DIZZINESS: 0
FEVER: 0
HEMATURIA: 0
DYSPHORIC MOOD: 0
ABDOMINAL PAIN: 0
NERVOUS/ANXIOUS: 0
FREQUENCY: 0

## 2024-09-29 NOTE — PROGRESS NOTES
Subjective   Reason for Visit: Azucena Nolen is an 89 y.o. female here for a Medicare Wellness visit.   Past Medical, Surgical, and Family History reviewed and updated in chart.         HPI  Specialists seen by patient: dentist  -derm  - Cardiology    Last pap/cervical cancer screening: n/a  Last mammogram:  N/A  Hx of colon ca screening: n/a  Hx of DXA: no declines  History of depression or anxiety:yes situational  Immunizations: not up to date - hasn't had shingrix, will do flu today, declines tdap, will get covid, will look into prevnar 20  Diet: Follows a healthy diet  Exercise: Some exercise, has access to fitness center there  Alcohol abuse screen:   1-2 drinks per week   How many times in the past year 4 or more drinks in a day? 0  Lung cancer screening:   Smoking history: ex-smoker and quit in the 1950s  Drug use: No  PHQ-2: 1  HCPOA: yes    Blood work came back showing a hemoglobin A1c of 8.4, up from 8.1 3 months ago.  Metabolic panel showed a glucose of 183, BUN 24, creatinine 1.4, GFR 34.      Follow up of diabetes.  Current treatments: Glimepiride 1 mg, Januvia 50 mg 3 times a week. Current symptoms include: hyperglycemia and paresthesia of the feet. Patient denies visual disturbances. Home sugars: 150s-170s fasting . Last diabetic eye exam 3 months ago.  Statin: Yes rosuvastatin.  ACE/ARB: Yes lisinopril 5 mg.    Does have urinary incontinence. More leakage. No urge incontinence. Sometimes she knows she has to go to but does leak. She does wear pads.     Patient Self Assessment of Health Status  Patient Self Assessment: Fair  Functional Ability/Level of Safety  Cognitive Impairment Observed: No cognitive impairment observed  Falls Home Safety Risk Factors  Home Safety Risk Factors: None  Nutrition and Exercise  Current Diet: Well Balanced Diet  Adequate Fluid Intake: Yes  Caffeine: Yes  Exercise Frequency: Infrequently  ADL Screening  Hearing - Right Ear: Hearing aid  Hearing - Left Ear: Hearing  "aid  Bathing: Independent  Dressing: Independent  Walks in Home: Independent  IADL's  Managing Finances: Independent  Grocery Shopping: Independent  Taking Medication: Independent  Doing Housework: Independent      Patient Care Team:  Veronika Restrepo DO as PCP - General  Veronika Restrepo DO as PCP - [facility] Attributed Provider  GRETEL Rodriguez-CNP as Nurse Practitioner (Cardiology)  Jose Angel Pace MD as Consulting Physician (Cardiology)     Review of Systems   Constitutional:  Negative for chills, fatigue and fever.   Respiratory:  Negative for cough, shortness of breath and wheezing.    Cardiovascular:  Negative for chest pain, palpitations and leg swelling.   Gastrointestinal:  Negative for abdominal pain, constipation, diarrhea and nausea.   Genitourinary:  Negative for dysuria, frequency, hematuria and urgency.   Neurological:  Negative for dizziness, numbness and headaches.   Psychiatric/Behavioral:  Negative for dysphoric mood. The patient is not nervous/anxious.        Objective   Vitals:  /70 (BP Location: Right arm, Patient Position: Sitting, BP Cuff Size: Large adult)   Pulse 66   Temp 36.7 °C (98 °F) (Temporal)   Ht 1.625 m (5' 3.98\")   Wt 80.5 kg (177 lb 6.4 oz)   SpO2 96%   BMI 30.47 kg/m²       Physical Exam  Constitutional:       General: She is not in acute distress.     Appearance: Normal appearance.   HENT:      Head: Normocephalic and atraumatic.      Right Ear: Tympanic membrane and ear canal normal.      Left Ear: Tympanic membrane and ear canal normal.      Mouth/Throat:      Mouth: Mucous membranes are moist.      Pharynx: No posterior oropharyngeal erythema.   Eyes:      Extraocular Movements: Extraocular movements intact.      Pupils: Pupils are equal, round, and reactive to light.   Cardiovascular:      Rate and Rhythm: Normal rate and regular rhythm.      Heart sounds: No murmur heard.  Pulmonary:      Effort: Pulmonary effort is normal. No respiratory distress. "      Breath sounds: Normal breath sounds. No wheezing.   Abdominal:      General: Bowel sounds are normal.      Palpations: Abdomen is soft.      Tenderness: There is no abdominal tenderness. There is no guarding.   Musculoskeletal:         General: Normal range of motion.      Cervical back: Neck supple.   Skin:     General: Skin is warm and dry.   Neurological:      General: No focal deficit present.      Mental Status: She is alert and oriented to person, place, and time.   Psychiatric:         Mood and Affect: Mood normal.         Behavior: Behavior normal.         Assessment/Plan   Problem List Items Addressed This Visit       Back pain, chronic    Relevant Medications    meloxicam (Mobic) 15 mg tablet    Chronic kidney disease, stage 3b (Multi)    Diabetes mellitus with microalbuminuria (Multi)    Relevant Orders    Referral to Clinical Pharmacy     Other Visit Diagnoses       Medicare annual wellness visit, subsequent    -  Primary    Need for immunization against influenza        Relevant Orders    Flu vaccine, trivalent, preservative free, HIGH-DOSE, age 65y+ (Fluzone) (Completed)    Depression screening                Blood work reviewed, fasting blood work done in May.   We reviewed appropriate preventative health screening guidelines.    We discussed regular aerobic exercise. We discussed proper nutrition and weight control.    Referral to our clinical pharmacy team for help with diabetic medications.  She is only taking Januvia 3 times a week and I feel that there may be a better way to manage her diabetes.  Possibly Jardiance.    Kidney function staying stable.  Explained that she should take meloxicam very very sparingly and would recommend taking a half a pill.    5-10 minutes were spent screening for depression using PHQ-2/PHQ-9 as documented in the chart

## 2024-09-30 ENCOUNTER — APPOINTMENT (OUTPATIENT)
Dept: PRIMARY CARE | Facility: CLINIC | Age: 89
End: 2024-09-30
Payer: MEDICARE

## 2024-09-30 VITALS
SYSTOLIC BLOOD PRESSURE: 120 MMHG | BODY MASS INDEX: 30.29 KG/M2 | TEMPERATURE: 98 F | WEIGHT: 177.4 LBS | HEART RATE: 66 BPM | DIASTOLIC BLOOD PRESSURE: 70 MMHG | OXYGEN SATURATION: 96 % | HEIGHT: 64 IN

## 2024-09-30 DIAGNOSIS — E11.29 DIABETES MELLITUS WITH MICROALBUMINURIA (MULTI): ICD-10-CM

## 2024-09-30 DIAGNOSIS — E11.22 CKD STAGE 3 SECONDARY TO DIABETES (MULTI): ICD-10-CM

## 2024-09-30 DIAGNOSIS — Z23 NEED FOR IMMUNIZATION AGAINST INFLUENZA: ICD-10-CM

## 2024-09-30 DIAGNOSIS — M54.50 CHRONIC LOW BACK PAIN WITHOUT SCIATICA, UNSPECIFIED BACK PAIN LATERALITY: ICD-10-CM

## 2024-09-30 DIAGNOSIS — N18.32 CHRONIC KIDNEY DISEASE, STAGE 3B (MULTI): ICD-10-CM

## 2024-09-30 DIAGNOSIS — Z13.31 DEPRESSION SCREENING: ICD-10-CM

## 2024-09-30 DIAGNOSIS — R80.9 DIABETES MELLITUS WITH MICROALBUMINURIA (MULTI): ICD-10-CM

## 2024-09-30 DIAGNOSIS — Z00.00 MEDICARE ANNUAL WELLNESS VISIT, SUBSEQUENT: Primary | ICD-10-CM

## 2024-09-30 DIAGNOSIS — G89.29 CHRONIC LOW BACK PAIN WITHOUT SCIATICA, UNSPECIFIED BACK PAIN LATERALITY: ICD-10-CM

## 2024-09-30 DIAGNOSIS — N18.30 CKD STAGE 3 SECONDARY TO DIABETES (MULTI): ICD-10-CM

## 2024-09-30 PROCEDURE — 3074F SYST BP LT 130 MM HG: CPT | Performed by: STUDENT IN AN ORGANIZED HEALTH CARE EDUCATION/TRAINING PROGRAM

## 2024-09-30 PROCEDURE — G0439 PPPS, SUBSEQ VISIT: HCPCS | Performed by: STUDENT IN AN ORGANIZED HEALTH CARE EDUCATION/TRAINING PROGRAM

## 2024-09-30 PROCEDURE — 1157F ADVNC CARE PLAN IN RCRD: CPT | Performed by: STUDENT IN AN ORGANIZED HEALTH CARE EDUCATION/TRAINING PROGRAM

## 2024-09-30 PROCEDURE — 1159F MED LIST DOCD IN RCRD: CPT | Performed by: STUDENT IN AN ORGANIZED HEALTH CARE EDUCATION/TRAINING PROGRAM

## 2024-09-30 PROCEDURE — G0008 ADMIN INFLUENZA VIRUS VAC: HCPCS | Performed by: STUDENT IN AN ORGANIZED HEALTH CARE EDUCATION/TRAINING PROGRAM

## 2024-09-30 PROCEDURE — 1160F RVW MEDS BY RX/DR IN RCRD: CPT | Performed by: STUDENT IN AN ORGANIZED HEALTH CARE EDUCATION/TRAINING PROGRAM

## 2024-09-30 PROCEDURE — 90662 IIV NO PRSV INCREASED AG IM: CPT | Performed by: STUDENT IN AN ORGANIZED HEALTH CARE EDUCATION/TRAINING PROGRAM

## 2024-09-30 PROCEDURE — 1170F FXNL STATUS ASSESSED: CPT | Performed by: STUDENT IN AN ORGANIZED HEALTH CARE EDUCATION/TRAINING PROGRAM

## 2024-09-30 PROCEDURE — 3078F DIAST BP <80 MM HG: CPT | Performed by: STUDENT IN AN ORGANIZED HEALTH CARE EDUCATION/TRAINING PROGRAM

## 2024-09-30 PROCEDURE — G0444 DEPRESSION SCREEN ANNUAL: HCPCS | Performed by: STUDENT IN AN ORGANIZED HEALTH CARE EDUCATION/TRAINING PROGRAM

## 2024-09-30 PROCEDURE — 99214 OFFICE O/P EST MOD 30 MIN: CPT | Performed by: STUDENT IN AN ORGANIZED HEALTH CARE EDUCATION/TRAINING PROGRAM

## 2024-09-30 ASSESSMENT — ACTIVITIES OF DAILY LIVING (ADL)
DOING_HOUSEWORK: INDEPENDENT
TAKING_MEDICATION: INDEPENDENT
GROCERY_SHOPPING: INDEPENDENT
BATHING: INDEPENDENT
DRESSING: INDEPENDENT
MANAGING_FINANCES: INDEPENDENT

## 2024-09-30 ASSESSMENT — PATIENT HEALTH QUESTIONNAIRE - PHQ9
2. FEELING DOWN, DEPRESSED OR HOPELESS: SEVERAL DAYS
10. IF YOU CHECKED OFF ANY PROBLEMS, HOW DIFFICULT HAVE THESE PROBLEMS MADE IT FOR YOU TO DO YOUR WORK, TAKE CARE OF THINGS AT HOME, OR GET ALONG WITH OTHER PEOPLE: NOT DIFFICULT AT ALL
SUM OF ALL RESPONSES TO PHQ9 QUESTIONS 1 AND 2: 1
1. LITTLE INTEREST OR PLEASURE IN DOING THINGS: NOT AT ALL

## 2024-09-30 ASSESSMENT — ENCOUNTER SYMPTOMS
DEPRESSION: 0
OCCASIONAL FEELINGS OF UNSTEADINESS: 1

## 2024-09-30 ASSESSMENT — ANXIETY QUESTIONNAIRES
GAD7 TOTAL SCORE: 3
6. BECOMING EASILY ANNOYED OR IRRITABLE: NOT AT ALL
1. FEELING NERVOUS, ANXIOUS, OR ON EDGE: SEVERAL DAYS
2. NOT BEING ABLE TO STOP OR CONTROL WORRYING: NOT AT ALL
4. TROUBLE RELAXING: NOT AT ALL
7. FEELING AFRAID AS IF SOMETHING AWFUL MIGHT HAPPEN: SEVERAL DAYS
5. BEING SO RESTLESS THAT IT IS HARD TO SIT STILL: NOT AT ALL
3. WORRYING TOO MUCH ABOUT DIFFERENT THINGS: SEVERAL DAYS
IF YOU CHECKED OFF ANY PROBLEMS ON THIS QUESTIONNAIRE, HOW DIFFICULT HAVE THESE PROBLEMS MADE IT FOR YOU TO DO YOUR WORK, TAKE CARE OF THINGS AT HOME, OR GET ALONG WITH OTHER PEOPLE: NOT DIFFICULT AT ALL

## 2024-09-30 NOTE — PATIENT INSTRUCTIONS
Recommendations for women annual wellness exam:   Make sure screenings for cervical, breast, and colon cancer are up to date if applicable- pap smears age 21-65, discuss mammogram starting at age 40, colonoscopy at age 45, earlier if positive family history for breast or colon cancer   Screen for osteoporosis with DXA bone scan starting at age 65 or sooner if risk factors present (long term steroid use, smoking, heavy alcohol use, history of fracture, rheumatoid arthritis, low body weight, family history of hip fracture)  Screening for lung cancer with low-dose CT in all adults age 55-77 who have a 30 pack-year smoking history and currently smoke or have quit within the past 15 years  Follow a healthy diet (Dash diet, Mediterranean diet)  Exercise 150 min/wk   Maintain healthy weight (BMI < 25)   Do not smoke   Alcohol in moderation (up to 1 drink/day)  Get enough sleep (7-8 hours/night)  Make sure immunizations are up to date (influenza, pneumococcal Prevnar 20, Tdap, shingles if age > 50)  Postmenopausal women or women with osteoporosis need minimum 1,200 mg calcium and 800 IU vitamin D daily  Talk to your physician if you have concerns about depression or anxiety  Visit dentist twice yearly

## 2024-10-02 ENCOUNTER — TELEMEDICINE (OUTPATIENT)
Dept: PHARMACY | Facility: HOSPITAL | Age: 89
End: 2024-10-02
Payer: MEDICARE

## 2024-10-02 DIAGNOSIS — R80.9 DIABETES MELLITUS WITH MICROALBUMINURIA (MULTI): ICD-10-CM

## 2024-10-02 DIAGNOSIS — E11.29 DIABETES MELLITUS WITH MICROALBUMINURIA (MULTI): ICD-10-CM

## 2024-10-02 DIAGNOSIS — N18.30 CKD STAGE 3 SECONDARY TO DIABETES (MULTI): ICD-10-CM

## 2024-10-02 DIAGNOSIS — E11.22 CKD STAGE 3 SECONDARY TO DIABETES (MULTI): ICD-10-CM

## 2024-10-02 RX ORDER — MELOXICAM 15 MG/1
15 TABLET ORAL DAILY
Qty: 24 TABLET | Refills: 3 | Status: SHIPPED | OUTPATIENT
Start: 2024-10-02

## 2024-10-02 NOTE — PROGRESS NOTES
Clinical Pharmacy Appointment    Patient ID: Azucena Nolen is a 89 y.o. female who presents for Chronic Kidney Disease and Diabetes.    Pt is here for First appointment.     Referring Provider: Veronika Restrepo DO  PCP: Veronika Restrepo DO   Last visit with PCP: 9/30/2024   Next visit with PCP: 1/17/2025      Subjective     HPI  DIABETES MELLITUS TYPE 2:    Diagnosed with diabetes: many years ago  Known diabetic complications: CKD  Does patient follow with Endocrinology: no  Last optometry exam: about 2 months ago     Current diabetic medications include:  glimepiride 1 mg daily  Januvia 50 mg three times weekly    Historical diabetic medications include:  metformin - stopped due to kidney function    Patient denies side effects at this time. Januvia is prescribed as 50 mg daily, but she is currently taking only three times per week due to cost.    Glucose Readings:  Patient has a glucometer and supplies, but does not regularly check blood glucose at home  She reports fasting levels in the high 100s when she does check  Patient is aware of how she feels when hypoglycemic (shaky, hungry) and denies symptoms at this time    Secondary Prevention:  Statin? Yes  ACE-I/ARB? Yes  Aspirin? Yes    Pertinent PMH Review:  PMH of Urinary Tract Infections: No    CHRONIC KIDNEY DISEASE ASSESSMENT  Does patient see nephrology? Not currently  Stage: 3b (eGFR 30-44)  CrCl approximately 32 mL/minute, appears to be at baseline  Proteinuria: Yes  ACE/ARB: Yes, lisinopril 5 mg daily    Medication Review  Current medications and doses were reviewed and are appropriate per current kidney function.  The following medications increase risk of JORDAN and should be closely monitored:  ACEi and NSAIDs  Discussed risks of meloxicam due to renal function, interaction with lisinopril, and increased bleeding risk with Eliquis and aspirin - patient reports only using as needed    Medication System Management  [facility]  [place]  Adherence/Organization: uses a pill box, takes medication regularly  Affordability/Accessibility: cost/coverage concerns  Patient identifies cost issues with Eliquis and Januvia  She only takes Januvia three times per week due to cost  She denies missed doses of Eliquis  Discussed financial qualifications for assistance programs. 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 She reviewed her most recent tax return during the call. Based on verbal financial information provided, she will not qualify at this time.    Objective   No Known Allergies  Social History     Social History Narrative    Not on file      Medication Review  Current Outpatient Medications   Medication Instructions    ALPRAZolam (XANAX) 0.25-0.5 mg, oral, Nightly PRN    apixaban (Eliquis) 2.5 mg tablet 1 tablet, oral, 2 times daily    aspirin 81 mg EC tablet 1 tablet, oral, Daily    cholecalciferol (VITAMIN D-3) 5,000 Units, oral, Daily    empagliflozin (JARDIANCE) 10 mg, oral, Daily    glimepiride (AMARYL) 1 mg, oral, Daily before breakfast    levothyroxine (SYNTHROID, LEVOXYL) 50 mcg, oral, Daily    lisinopril 5 mg tablet TAKE 1 TABLET BY MOUTH EVERY DAY    meloxicam (MOBIC) 15 mg, oral, Daily    omeprazole OTC (PriLOSEC OTC) 20 mg EC tablet oral, As needed    rosuvastatin (CRESTOR) 40 mg, oral, Daily      Vitals  BP Readings from Last 2 Encounters:   09/30/24 120/70   08/13/24 140/64     BMI Readings from Last 1 Encounters:   09/30/24 30.47 kg/m²      Labs  A1C  Lab Results   Component Value Date    HGBA1C 8.4 (H) 09/25/2024    HGBA1C 8.1 (H) 05/28/2024    HGBA1C 7.8 (A) 12/13/2023     BMP  Lab Results   Component Value Date    CALCIUM 9.1 09/25/2024     09/25/2024    K 4.3 09/25/2024    CO2 24 09/25/2024     09/25/2024    BUN 24 (H) 09/25/2024    CREATININE 1.43 (H) 09/25/2024    EGFR 34 (L) 09/25/2024     LFTs  Lab Results   Component Value Date    ALT 20 05/28/2024    AST 18 05/28/2024    ALKPHOS 57 05/28/2024     BILITOT 0.5 05/28/2024     FLP  Lab Results   Component Value Date    TRIG 194 (H) 05/28/2024    CHOL 134 05/28/2024    LDLF 35 05/15/2023    LDLCALC 46 05/28/2024    HDL 49.3 05/28/2024     Urine Microalbumin  Lab Results   Component Value Date    MICROALBCREA 226.7 (H) 05/15/2023     Weight Management  Wt Readings from Last 3 Encounters:   09/30/24 80.5 kg (177 lb 6.4 oz)   08/13/24 79.4 kg (175 lb)   06/04/24 80.8 kg (178 lb 3.2 oz)      There is no height or weight on file to calculate BMI.     Assessment/Plan   Problem List Items Addressed This Visit       CKD stage 3 secondary to diabetes (Multi)    Relevant Medications    empagliflozin (Jardiance) 10 mg    Diabetes mellitus with microalbuminuria (Multi)    Relevant Medications    empagliflozin (Jardiance) 10 mg     DISCUSSION/PLAN:  Patient with diabetes that needs improvement, most recent A1c of 8.4% in September 2024 (goal < 8.0% due to age and comorbidities). Patient has only been taking Januvia three times weekly due to cost. She is not eligible for financial assistance programs. She has CKD and continues to have proteinuria despite lisinopril treatment. Will stop Januvia due to cost and add Jardiance for renal benefits and improved glucose control.    CONTINUE:  glimepiride 1 mg daily  STOP  Januvia  START  Jardiance 10 mg once daily    Future Considerations:  Prefer to avoid increasing glimepiride due to CKD and hypoglycemia risk  Could consider pioglitazone if cost continues to be a concern    Monitoring and Education:  Educated patient on Jardiance mechanism of action, benefits, side effects, and monitoring  Reviewed identification and treatment of hypoglycemia  Answered all patient questions    Continue all meds under the continuation of care with the referring provider and clinical pharmacy team.    Clinical Pharmacist follow-up: October 16th, 2024 at 11:00 AM  Orders placed[facility] [place]    Thank you,   Cora Dinero,  PharmD  Clinical Pharmacy Specialist, Primary Care   [phone]    Verbal consent to manage patient's drug therapy was obtained from the patient . Patient was informed she may decline to participate or withdraw from participation in pharmacy services at any time.

## 2024-10-02 NOTE — Clinical Note
Patient makes over $130k per year and would not qualify for any assistance programs. I was wondering if she was instructed to take her Januvia that way due to kidney function, but it was due to cost. She says she can afford it, but doesn't want to pay that much for it. We talked about Jardiance benefits, cost, and alternatives. She decided to proceed with switching to Jardiance, but I'm still not convinced she will take it every day.

## 2024-10-03 PROBLEM — N18.32 CHRONIC KIDNEY DISEASE, STAGE 3B (MULTI): Status: ACTIVE | Noted: 2023-02-21

## 2024-10-16 ENCOUNTER — APPOINTMENT (OUTPATIENT)
Dept: PHARMACY | Facility: HOSPITAL | Age: 89
End: 2024-10-16
Payer: MEDICARE

## 2024-10-16 DIAGNOSIS — E11.29 DIABETES MELLITUS WITH MICROALBUMINURIA (MULTI): ICD-10-CM

## 2024-10-16 DIAGNOSIS — N18.30 CKD STAGE 3 SECONDARY TO DIABETES (MULTI): ICD-10-CM

## 2024-10-16 DIAGNOSIS — R80.9 DIABETES MELLITUS WITH MICROALBUMINURIA (MULTI): ICD-10-CM

## 2024-10-16 DIAGNOSIS — E11.22 CKD STAGE 3 SECONDARY TO DIABETES (MULTI): ICD-10-CM

## 2024-10-16 NOTE — Clinical Note
"She did switch from Januvia to Jardiance but still only taking three times per week. I encouraged her to take it daily as prescribed, and she wants to \"see how it goes\". Will consider going up to 25 mg dose at next visit if she continues to only use three times per week."

## 2024-10-16 NOTE — PROGRESS NOTES
Clinical Pharmacy Appointment    Patient ID: Azucena Nolen is a 89 y.o. female who presents for Diabetes and Chronic Kidney Disease.    Pt is here for Follow Up appointment.     Referring Provider: Veronika Restrepo DO  PCP: Veronika Restrepo DO   Last visit with PCP: 9/30/2024   Next visit with PCP: 1/17/2025    INTERVAL HISTORY   Patient's last appointment with clinical pharmacy team on 10/2/2024  Recent hospitalizations? No   Medication changes? No  Missed doses of medications? Yes   Patient reports starting Jardiance as per last pharmacy visit, but she is only taking 3 times weekly and not daily as prescribed  Side effects? No   Updated relevant labs? No       Subjective     HPI  DIABETES MELLITUS TYPE 2:    Diagnosed with diabetes: many years ago  Known diabetic complications: CKD  Does patient follow with Endocrinology: no  Last optometry exam: about 2 months ago     Current diabetic medications include:  glimepiride 1 mg daily  Jardiance 10 mg three times weekly    Patient denies side effects at this time. Jardiance is prescribed as 10 mg daily, but she is currently taking only three times per week due to cost.    Historical diabetic medications include:  metformin - stopped due to kidney function  Januvia - stopped when switched to Jardiance    Glucose Readings:  Patient has a glucometer and supplies and checks blood glucose occasionally at home  She reports fasting levels in the high 100s when she does check  Fasting blood glucose this morning 164 mg/dL  Patient is aware of how she feels when hypoglycemic (shaky, hungry) and denies symptoms at this time    Secondary Prevention:  Statin? Yes  ACE-I/ARB? Yes  Aspirin? Yes    Pertinent PMH Review:  PMH of Urinary Tract Infections: No    CHRONIC KIDNEY DISEASE ASSESSMENT  Does patient see nephrology? Not currently  Stage: 3b (eGFR 30-44)  CrCl approximately 32 mL/minute, appears to be at baseline  Proteinuria: Yes  ACE/ARB: Yes, lisinopril 5 mg  daily    Medication Review  Current medications and doses were reviewed and are appropriate per current kidney function.  The following medications increase risk of JORDAN and should be closely monitored:  ACEi and NSAIDs  Previously discussed risks of meloxicam due to renal function, interaction with lisinopril, and increased bleeding risk with Eliquis and aspirin - patient reports only using as needed    Medication System Management  Patient's preferred pharmacy: [facility] in [place], [place]  Adherence/Organization: uses a pill box, takes medication regularly  Affordability/Accessibility: cost/coverage concerns  Patient identifies cost issues with Eliquis and Jardiance, but she does not qualify for patient assistance programs    Objective   No Known Allergies  Social History     Social History Narrative    Not on file      Medication Review  Current Outpatient Medications   Medication Instructions    ALPRAZolam (XANAX) 0.25-0.5 mg, oral, Nightly PRN    apixaban (Eliquis) 2.5 mg tablet 1 tablet, oral, 2 times daily    aspirin 81 mg EC tablet 1 tablet, oral, Daily    cholecalciferol (VITAMIN D-3) 5,000 Units, oral, Daily    empagliflozin (JARDIANCE) 10 mg, oral, Daily    glimepiride (AMARYL) 1 mg, oral, Daily before breakfast    levothyroxine (SYNTHROID, LEVOXYL) 50 mcg, oral, Daily    lisinopril 5 mg tablet TAKE 1 TABLET BY MOUTH EVERY DAY    meloxicam (MOBIC) 15 mg, oral, Daily    omeprazole OTC (PriLOSEC OTC) 20 mg EC tablet oral, As needed    rosuvastatin (CRESTOR) 40 mg, oral, Daily      Vitals  BP Readings from Last 2 Encounters:   09/30/24 120/70   08/13/24 140/64     BMI Readings from Last 1 Encounters:   09/30/24 30.47 kg/m²      Labs  A1C  Lab Results   Component Value Date    HGBA1C 8.4 (H) 09/25/2024    HGBA1C 8.1 (H) 05/28/2024    HGBA1C 7.8 (A) 12/13/2023     BMP  Lab Results   Component Value Date    CALCIUM 9.1 09/25/2024     09/25/2024    K 4.3 09/25/2024    CO2 24 09/25/2024     09/25/2024    BUN 24  (H) 09/25/2024    CREATININE 1.43 (H) 09/25/2024    EGFR 34 (L) 09/25/2024     LFTs  Lab Results   Component Value Date    ALT 20 05/28/2024    AST 18 05/28/2024    ALKPHOS 57 05/28/2024    BILITOT 0.5 05/28/2024     FLP  Lab Results   Component Value Date    TRIG 194 (H) 05/28/2024    CHOL 134 05/28/2024    LDLF 35 05/15/2023    LDLCALC 46 05/28/2024    HDL 49.3 05/28/2024     Urine Microalbumin  Lab Results   Component Value Date    MICROALBCREA 226.7 (H) 05/15/2023     Weight Management  Wt Readings from Last 3 Encounters:   09/30/24 80.5 kg (177 lb 6.4 oz)   08/13/24 79.4 kg (175 lb)   06/04/24 80.8 kg (178 lb 3.2 oz)      There is no height or weight on file to calculate BMI.     Assessment/Plan   Problem List Items Addressed This Visit       Diabetes mellitus with microalbuminuria (Multi)     Other Visit Diagnoses       CKD stage 3 secondary to diabetes (Multi)                DISCUSSION/PLAN:  Patient with diabetes that needs improvement, most recent A1c of 8.4% in September 2024 (goal < 8.0% due to age and comorbidities). She denies side effects or symptoms of hypoglycemia at this time. Patient was recently switched from Januvia to Jardiance for renal benefits. She has only been taking Jardiance three times per week due to cost, and she is not eligible for financial assistance programs. Encouraged patient to take the medication daily as prescribed.     CONTINUE:  glimepiride 1 mg daily  Jardiance 10 mg once daily    Future Considerations:  Prefer to avoid increasing glimepiride due to CKD and hypoglycemia risk  Could consider pioglitazone if cost continues to be a concern    Monitoring and Education:  Reviewed side effects and monitoring of Jardiance and glimepiride  Emphasized the importance of using taking medications as prescribed  Reviewed identification and treatment of hypoglycemia  Answered all patient questions    Continue all meds under the continuation of care with the referring provider and  clinical pharmacy team.    Clinical Pharmacist follow-up: December 18th, 2024 at 11:00 AM  Orders placed: none at this time    Thank you,   Cora Dinero, PharmD  Clinical Pharmacy Specialist, Primary Care   [phone]    Verbal consent to manage patient's drug therapy was obtained from the patient . Patient was informed she may decline to participate or withdraw from participation in pharmacy services at any time.

## 2024-11-02 DIAGNOSIS — E11.29 TYPE 2 DIABETES MELLITUS WITH OTHER DIABETIC KIDNEY COMPLICATION: ICD-10-CM

## 2024-11-02 DIAGNOSIS — R80.9 PROTEINURIA, UNSPECIFIED: ICD-10-CM

## 2024-11-05 RX ORDER — LISINOPRIL 5 MG/1
TABLET ORAL
Qty: 90 TABLET | Refills: 1 | Status: SHIPPED | OUTPATIENT
Start: 2024-11-05

## 2024-11-08 ENCOUNTER — TELEPHONE (OUTPATIENT)
Dept: CARDIOLOGY | Facility: HOSPITAL | Age: 89
End: 2024-11-08
Payer: MEDICARE

## 2024-11-08 DIAGNOSIS — I48.0 PAROXYSMAL ATRIAL FIBRILLATION (MULTI): ICD-10-CM

## 2024-11-08 NOTE — TELEPHONE ENCOUNTER
Patient left a message that she needs a refill on her eliquis 2.5 mg twice a day sent to [facility] in [place]. Thank you.

## 2024-11-28 DIAGNOSIS — E11.22 CKD STAGE 3 SECONDARY TO DIABETES (MULTI): ICD-10-CM

## 2024-11-28 DIAGNOSIS — R80.9 DIABETES MELLITUS WITH MICROALBUMINURIA (MULTI): ICD-10-CM

## 2024-11-28 DIAGNOSIS — E11.29 DIABETES MELLITUS WITH MICROALBUMINURIA (MULTI): ICD-10-CM

## 2024-11-28 DIAGNOSIS — N18.30 CKD STAGE 3 SECONDARY TO DIABETES (MULTI): ICD-10-CM

## 2024-11-29 RX ORDER — EMPAGLIFLOZIN 10 MG/1
10 TABLET, FILM COATED ORAL DAILY
Qty: 30 TABLET | Refills: 1 | Status: SHIPPED | OUTPATIENT
Start: 2024-11-29

## 2024-12-16 ENCOUNTER — HOSPITAL ENCOUNTER (OUTPATIENT)
Dept: CARDIOLOGY | Facility: CLINIC | Age: 89
Discharge: HOME | End: 2024-12-16
Payer: MEDICARE

## 2024-12-16 DIAGNOSIS — Z95.0 PRESENCE OF CARDIAC PACEMAKER: ICD-10-CM

## 2024-12-16 DIAGNOSIS — I44.2 ATRIOVENTRICULAR BLOCK, COMPLETE (MULTI): ICD-10-CM

## 2024-12-16 PROCEDURE — 93296 REM INTERROG EVL PM/IDS: CPT

## 2024-12-16 PROCEDURE — 93294 REM INTERROG EVL PM/LDLS PM: CPT | Performed by: INTERNAL MEDICINE

## 2024-12-18 ENCOUNTER — APPOINTMENT (OUTPATIENT)
Dept: PHARMACY | Facility: HOSPITAL | Age: 89
End: 2024-12-18
Payer: MEDICARE

## 2024-12-18 DIAGNOSIS — E11.22 CKD STAGE 3 SECONDARY TO DIABETES (MULTI): ICD-10-CM

## 2024-12-18 DIAGNOSIS — N18.30 CKD STAGE 3 SECONDARY TO DIABETES (MULTI): ICD-10-CM

## 2024-12-18 DIAGNOSIS — E11.29 DIABETES MELLITUS WITH MICROALBUMINURIA (MULTI): ICD-10-CM

## 2024-12-18 DIAGNOSIS — R80.9 DIABETES MELLITUS WITH MICROALBUMINURIA (MULTI): ICD-10-CM

## 2024-12-18 NOTE — PROGRESS NOTES
Clinical Pharmacy Appointment    Patient ID: Azucena Nolen is a 89 y.o. female who presents for Diabetes and Chronic Kidney Disease.    Pt is here for Follow Up appointment.     Referring Provider: Veronika Restrepo DO  PCP: Veronika Restrepo DO   Last visit with PCP: 9/30/2024   Next visit with PCP: 1/17/2025    INTERVAL HISTORY   Patient's last appointment with clinical pharmacy team on 10/16/2024  Recent hospitalizations? No   Medication changes? No  Missed doses of medications? Yes   Patient takes Jardiance three times weekly due to cost  Side effects? No   Updated relevant labs? No       Subjective     HPI  DIABETES MELLITUS TYPE 2:    Diagnosed with diabetes: many years ago  Known diabetic complications: CKD  Does patient follow with Endocrinology: no  Last optometry exam: about 2 months ago     Current diabetic medications include:  glimepiride 1 mg daily  Jardiance 10 mg three times weekly    Patient denies side effects at this time. Jardiance is prescribed as 10 mg daily, but she is currently taking only three times per week due to cost.    Historical diabetic medications include:  metformin - stopped due to kidney function  Januvia - stopped when switched to Jardiance    Glucose Readings:  Patient has a glucometer and supplies and checks blood glucose occasionally at home  Most recent fasting blood glucose level of 144 mg/dL on 12/18/2024  Previous readings in 160s  Patient is aware of how she feels when hypoglycemic (shaky, hungry)   She reports occasional symptoms of hypoglycemia and treats appropriately    Secondary Prevention:  Statin? Yes  ACE-I/ARB? Yes  Aspirin? Yes    Pertinent PMH Review:  PMH of Urinary Tract Infections: No    CHRONIC KIDNEY DISEASE ASSESSMENT  Does patient see nephrology? Not currently  Stage: 3b (eGFR 30-44)  CrCl approximately 32 mL/minute, appears to be at baseline  Proteinuria: Yes  ACE/ARB: Yes, lisinopril 5 mg daily    Medication Review  Current medications and doses  were reviewed and are appropriate per current kidney function.  The following medications increase risk of JORDAN and should be closely monitored:  ACEi and NSAIDs  Previously discussed risks of meloxicam due to renal function, interaction with lisinopril, and increased bleeding risk with Eliquis and aspirin - patient reports only using as needed    Medication System Management  Patient's preferred pharmacy: [facility] in [place], [place]  Adherence/Organization: uses a pill box, takes medication regularly  Affordability/Accessibility: cost/coverage concerns  Patient identifies cost issues with Eliquis and Jardiance, but she does not qualify for patient assistance programs    Objective   No Known Allergies  Social History     Social History Narrative    Not on file      Medication Review  Current Outpatient Medications   Medication Instructions    ALPRAZolam (XANAX) 0.25-0.5 mg, oral, Nightly PRN    apixaban (ELIQUIS) 2.5 mg, oral, 2 times daily    aspirin 81 mg EC tablet 1 tablet, oral, Daily    cholecalciferol (VITAMIN D-3) 5,000 Units, oral, Daily    glimepiride (AMARYL) 1 mg, oral, Daily before breakfast    Jardiance 10 mg, oral, Daily    levothyroxine (SYNTHROID, LEVOXYL) 50 mcg, oral, Daily    lisinopril 5 mg tablet TAKE 1 TABLET BY MOUTH EVERY DAY    meloxicam (MOBIC) 15 mg, oral, Daily    omeprazole OTC (PriLOSEC OTC) 20 mg EC tablet oral, As needed    rosuvastatin (CRESTOR) 40 mg, oral, Daily      Vitals  BP Readings from Last 2 Encounters:   09/30/24 120/70   08/13/24 140/64     BMI Readings from Last 1 Encounters:   09/30/24 30.47 kg/m²      Labs  A1C  Lab Results   Component Value Date    HGBA1C 8.4 (H) 09/25/2024    HGBA1C 8.1 (H) 05/28/2024    HGBA1C 7.8 (A) 12/13/2023     BMP  Lab Results   Component Value Date    CALCIUM 9.1 09/25/2024     09/25/2024    K 4.3 09/25/2024    CO2 24 09/25/2024     09/25/2024    BUN 24 (H) 09/25/2024    CREATININE 1.43 (H) 09/25/2024    EGFR 34 (L) 09/25/2024     LFTs  Lab  Results   Component Value Date    ALT 20 05/28/2024    AST 18 05/28/2024    ALKPHOS 57 05/28/2024    BILITOT 0.5 05/28/2024     FLP  Lab Results   Component Value Date    TRIG 194 (H) 05/28/2024    CHOL 134 05/28/2024    LDLF 35 05/15/2023    LDLCALC 46 05/28/2024    HDL 49.3 05/28/2024     Urine Microalbumin  Lab Results   Component Value Date    MICROALBCREA 226.7 (H) 05/15/2023     Weight Management  Wt Readings from Last 3 Encounters:   09/30/24 80.5 kg (177 lb 6.4 oz)   08/13/24 79.4 kg (175 lb)   06/04/24 80.8 kg (178 lb 3.2 oz)      There is no height or weight on file to calculate BMI.     Assessment/Plan   Problem List Items Addressed This Visit       Diabetes mellitus with microalbuminuria (Multi)    Relevant Orders    Hemoglobin A1c    Basic metabolic panel     Other Visit Diagnoses       CKD stage 3 secondary to diabetes (Multi)        Relevant Orders    Hemoglobin A1c    Basic metabolic panel            DISCUSSION/PLAN:  Patient with diabetes that needs improvement, most recent A1c of 8.4% in September 2024 (goal < 8.0% due to age and comorbidities). She was switched from Januvia to Jardiance in October 2024 for renal benefits. Patient continues to take Jardiance three times weekly due to cost. She has noticed some improvement in fasting blood glucose levels. She endorses occasional symptoms of hypoglycemia - risk higher with glimepiride in 89 year old patient. Discussed preference for daily dosing of Jardiance as this might allow for discontinuation of glimepiride. Patient will consider this. I am ordering an updated A1c and BMP. She has an appointment with her PCP next month and would like to follow up with pharmacy as needed.    CONTINUE:  glimepiride 1 mg daily  Jardiance 10 mg once daily    Future Considerations:  Prefer to avoid increasing glimepiride due to CKD and hypoglycemia risk  Could consider pioglitazone if cost continues to be a concern    Monitoring and Education:  Reviewed side effects  and monitoring of Jardiance and glimepiride  Emphasized the importance of using taking medications as prescribed  Reviewed symptoms and treatment of hypoglycemia  Answered all patient questions    Continue all meds under the continuation of care with the referring provider and clinical pharmacy team.    Clinical Pharmacist follow-up: as needed  Orders placed: none at this time    Thank you,   Cora Dinero, PharmD  Clinical Pharmacy Specialist, Primary Care   [phone]    Verbal consent to manage patient's drug therapy was obtained from the patient . Patient was informed she may decline to participate or withdraw from participation in pharmacy services at any time.

## 2025-01-09 ENCOUNTER — LAB (OUTPATIENT)
Dept: LAB | Facility: LAB | Age: OVER 89
End: 2025-01-09
Payer: MEDICARE

## 2025-01-09 DIAGNOSIS — E11.22 CKD STAGE 3 SECONDARY TO DIABETES (MULTI): ICD-10-CM

## 2025-01-09 DIAGNOSIS — N18.30 CKD STAGE 3 SECONDARY TO DIABETES (MULTI): ICD-10-CM

## 2025-01-09 DIAGNOSIS — R80.9 DIABETES MELLITUS WITH MICROALBUMINURIA (MULTI): ICD-10-CM

## 2025-01-09 DIAGNOSIS — E11.29 DIABETES MELLITUS WITH MICROALBUMINURIA (MULTI): ICD-10-CM

## 2025-01-09 LAB
ANION GAP SERPL CALC-SCNC: 12 MMOL/L (ref 10–20)
BUN SERPL-MCNC: 22 MG/DL (ref 6–23)
CALCIUM SERPL-MCNC: 9.3 MG/DL (ref 8.6–10.3)
CHLORIDE SERPL-SCNC: 106 MMOL/L (ref 98–107)
CO2 SERPL-SCNC: 28 MMOL/L (ref 21–32)
CREAT SERPL-MCNC: 1.38 MG/DL (ref 0.5–1.05)
EGFRCR SERPLBLD CKD-EPI 2021: 36 ML/MIN/1.73M*2
EST. AVERAGE GLUCOSE BLD GHB EST-MCNC: 169 MG/DL
GLUCOSE SERPL-MCNC: 151 MG/DL (ref 74–99)
HBA1C MFR BLD: 7.5 %
POTASSIUM SERPL-SCNC: 4 MMOL/L (ref 3.5–5.3)
SODIUM SERPL-SCNC: 142 MMOL/L (ref 136–145)

## 2025-01-09 PROCEDURE — 83036 HEMOGLOBIN GLYCOSYLATED A1C: CPT

## 2025-01-09 PROCEDURE — 80048 BASIC METABOLIC PNL TOTAL CA: CPT

## 2025-01-15 ASSESSMENT — ENCOUNTER SYMPTOMS
NUMBNESS: 0
DIZZINESS: 0
DIARRHEA: 0
PALPITATIONS: 0
HEMATURIA: 0
NERVOUS/ANXIOUS: 0
HEADACHES: 0
CHILLS: 0
SHORTNESS OF BREATH: 0
DYSPHORIC MOOD: 0
FATIGUE: 0
CONSTIPATION: 0
FEVER: 0
COUGH: 0
FREQUENCY: 0
WHEEZING: 0
DYSURIA: 0
ABDOMINAL PAIN: 0
NAUSEA: 0

## 2025-01-15 NOTE — PROGRESS NOTES
Subjective   Patient ID: Azucena Nolen is a 89 y.o. female who presents for Follow-up (Lab results) and Flu Vaccine (Received 09/30/24).    Diabetes  Pertinent negatives for hypoglycemia include no dizziness, headaches or nervousness/anxiousness. Pertinent negatives for diabetes include no chest pain and no fatigue.      Follow up of diabetes.  Current treatments: Jardiance 3 times a week, glimepiride 1 mg daily.  Takes Jardiance 3 times a week because of cost.  Not on metformin because of kidney function.  Current symptoms include: paresthesia of the feet, sometimes feels shaky and eats and gets better. Patient denies visual disturbances. Home sugars: checks sometimes, 150s, doesn't do it often.  Statin: Yes rosuvastatin ACE/ARB: Yes lisinopril.  Blood work from last week showed a hemoglobin A1c of 7.5, improved from 8.4 3 months prior.  Blood sugar was 151.  Creatinine and GFR stable at 1.38 and 36 respectively    Follow-up of  hypertension. Current medications ACE Inhibitor. Home blood pressure readings: not doing.   Associated signs and symptoms: none. Patient denies: blurred vision, chest pain, dyspnea, headache, and palpitations. Use of agents associated with hypertension: NSAIDS and thyroid hormones. Medication compliance: taking as prescribed.       Review of Systems   Constitutional:  Negative for chills, fatigue and fever.   Respiratory:  Negative for cough, shortness of breath and wheezing.    Cardiovascular:  Negative for chest pain, palpitations and leg swelling.   Gastrointestinal:  Negative for abdominal pain, constipation, diarrhea and nausea.   Genitourinary:  Negative for dysuria, frequency, hematuria and urgency.   Neurological:  Negative for dizziness, numbness and headaches.   Psychiatric/Behavioral:  Negative for dysphoric mood. The patient is not nervous/anxious.        Objective   /80 (BP Location: Right arm, Patient Position: Sitting, BP Cuff Size: Adult)   Pulse 77   Temp 36.3 °C  "(97.4 °F) (Temporal)   Ht 1.615 m (5' 3.58\")   Wt 79.3 kg (174 lb 12.8 oz)   SpO2 97%   BMI 30.40 kg/m²     Physical Exam  Constitutional:       Appearance: Normal appearance.   HENT:      Head: Normocephalic and atraumatic.   Eyes:      Extraocular Movements: Extraocular movements intact.      Pupils: Pupils are equal, round, and reactive to light.   Cardiovascular:      Rate and Rhythm: Normal rate and regular rhythm.      Heart sounds: Normal heart sounds. No murmur heard.  Pulmonary:      Effort: Pulmonary effort is normal.      Breath sounds: Normal breath sounds. No wheezing.   Abdominal:      General: Bowel sounds are normal.      Palpations: Abdomen is soft.      Tenderness: There is no abdominal tenderness. There is no guarding.   Musculoskeletal:         General: Normal range of motion.   Skin:     General: Skin is warm and dry.   Neurological:      General: No focal deficit present.      Mental Status: She is alert and oriented to person, place, and time.   Psychiatric:         Mood and Affect: Mood normal.         Behavior: Behavior normal.         Assessment/Plan   Problem List Items Addressed This Visit       Diabetes mellitus with microalbuminuria (Multi)    Relevant Medications    empagliflozin (Jardiance) 10 mg     Other Visit Diagnoses       CKD stage 3 secondary to diabetes (Multi)        Relevant Medications    empagliflozin (Jardiance) 10 mg    Type 2 diabetes mellitus with other diabetic kidney complication        Relevant Medications    lisinopril 5 mg tablet    Proteinuria, unspecified        Relevant Medications    lisinopril 5 mg tablet              Will have her continue her current medications.  Her A1c has significantly improved in the last 3 months.  Will have her continue Jardiance 3 times a week.  May want to consider going off of glimepiride if her sugars are going low.               Patient understands and agrees with treatment plan    Veronika Restrepo,    01/17/25   "

## 2025-01-17 ENCOUNTER — APPOINTMENT (OUTPATIENT)
Dept: PRIMARY CARE | Facility: CLINIC | Age: OVER 89
End: 2025-01-17
Payer: MEDICARE

## 2025-01-17 VITALS
TEMPERATURE: 97.4 F | WEIGHT: 174.8 LBS | HEIGHT: 64 IN | HEART RATE: 77 BPM | SYSTOLIC BLOOD PRESSURE: 134 MMHG | DIASTOLIC BLOOD PRESSURE: 80 MMHG | OXYGEN SATURATION: 97 % | BODY MASS INDEX: 29.84 KG/M2

## 2025-01-17 DIAGNOSIS — N18.30 CKD STAGE 3 SECONDARY TO DIABETES (MULTI): ICD-10-CM

## 2025-01-17 DIAGNOSIS — E11.22 CKD STAGE 3 SECONDARY TO DIABETES (MULTI): ICD-10-CM

## 2025-01-17 DIAGNOSIS — R80.9 PROTEINURIA, UNSPECIFIED: ICD-10-CM

## 2025-01-17 DIAGNOSIS — E11.29 TYPE 2 DIABETES MELLITUS WITH OTHER DIABETIC KIDNEY COMPLICATION: ICD-10-CM

## 2025-01-17 DIAGNOSIS — E11.29 DIABETES MELLITUS WITH MICROALBUMINURIA (MULTI): ICD-10-CM

## 2025-01-17 DIAGNOSIS — R80.9 DIABETES MELLITUS WITH MICROALBUMINURIA (MULTI): ICD-10-CM

## 2025-01-17 PROCEDURE — 99213 OFFICE O/P EST LOW 20 MIN: CPT | Performed by: STUDENT IN AN ORGANIZED HEALTH CARE EDUCATION/TRAINING PROGRAM

## 2025-01-17 PROCEDURE — 1160F RVW MEDS BY RX/DR IN RCRD: CPT | Performed by: STUDENT IN AN ORGANIZED HEALTH CARE EDUCATION/TRAINING PROGRAM

## 2025-01-17 PROCEDURE — 1157F ADVNC CARE PLAN IN RCRD: CPT | Performed by: STUDENT IN AN ORGANIZED HEALTH CARE EDUCATION/TRAINING PROGRAM

## 2025-01-17 PROCEDURE — 1159F MED LIST DOCD IN RCRD: CPT | Performed by: STUDENT IN AN ORGANIZED HEALTH CARE EDUCATION/TRAINING PROGRAM

## 2025-01-17 PROCEDURE — 1036F TOBACCO NON-USER: CPT | Performed by: STUDENT IN AN ORGANIZED HEALTH CARE EDUCATION/TRAINING PROGRAM

## 2025-01-17 PROCEDURE — 3079F DIAST BP 80-89 MM HG: CPT | Performed by: STUDENT IN AN ORGANIZED HEALTH CARE EDUCATION/TRAINING PROGRAM

## 2025-01-17 PROCEDURE — G2211 COMPLEX E/M VISIT ADD ON: HCPCS | Performed by: STUDENT IN AN ORGANIZED HEALTH CARE EDUCATION/TRAINING PROGRAM

## 2025-01-17 PROCEDURE — 3075F SYST BP GE 130 - 139MM HG: CPT | Performed by: STUDENT IN AN ORGANIZED HEALTH CARE EDUCATION/TRAINING PROGRAM

## 2025-01-17 RX ORDER — LISINOPRIL 5 MG/1
5 TABLET ORAL DAILY
Qty: 90 TABLET | Refills: 1 | Status: SHIPPED | OUTPATIENT
Start: 2025-01-17

## 2025-01-17 ASSESSMENT — ENCOUNTER SYMPTOMS
OCCASIONAL FEELINGS OF UNSTEADINESS: 1
DEPRESSION: 0

## 2025-01-17 ASSESSMENT — PATIENT HEALTH QUESTIONNAIRE - PHQ9
2. FEELING DOWN, DEPRESSED OR HOPELESS: NOT AT ALL
SUM OF ALL RESPONSES TO PHQ9 QUESTIONS 1 AND 2: 0
1. LITTLE INTEREST OR PLEASURE IN DOING THINGS: NOT AT ALL

## 2025-02-18 ENCOUNTER — OFFICE VISIT (OUTPATIENT)
Dept: CARDIOLOGY | Facility: HOSPITAL | Age: OVER 89
End: 2025-02-18
Payer: MEDICARE

## 2025-02-18 VITALS
BODY MASS INDEX: 29.69 KG/M2 | OXYGEN SATURATION: 98 % | SYSTOLIC BLOOD PRESSURE: 118 MMHG | WEIGHT: 173.9 LBS | HEART RATE: 71 BPM | HEIGHT: 64 IN | DIASTOLIC BLOOD PRESSURE: 70 MMHG

## 2025-02-18 DIAGNOSIS — I10 PRIMARY HYPERTENSION: ICD-10-CM

## 2025-02-18 DIAGNOSIS — E78.00 PURE HYPERCHOLESTEROLEMIA: ICD-10-CM

## 2025-02-18 DIAGNOSIS — I25.10 CAD IN NATIVE ARTERY: ICD-10-CM

## 2025-02-18 DIAGNOSIS — I48.0 PAROXYSMAL ATRIAL FIBRILLATION (MULTI): ICD-10-CM

## 2025-02-18 DIAGNOSIS — Z95.0 PRESENCE OF CARDIAC PACEMAKER: Primary | ICD-10-CM

## 2025-02-18 LAB
ATRIAL RATE: 71 BPM
P AXIS: 54 DEGREES
P OFFSET: 163 MS
P ONSET: 107 MS
PR INTERVAL: 164 MS
Q ONSET: 189 MS
QRS COUNT: 12 BEATS
QRS DURATION: 148 MS
QT INTERVAL: 462 MS
QTC CALCULATION(BAZETT): 502 MS
QTC FREDERICIA: 488 MS
R AXIS: -88 DEGREES
T AXIS: 87 DEGREES
T OFFSET: 420 MS
VENTRICULAR RATE: 71 BPM

## 2025-02-18 PROCEDURE — 1160F RVW MEDS BY RX/DR IN RCRD: CPT | Performed by: NURSE PRACTITIONER

## 2025-02-18 PROCEDURE — 93005 ELECTROCARDIOGRAM TRACING: CPT | Performed by: NURSE PRACTITIONER

## 2025-02-18 PROCEDURE — 99214 OFFICE O/P EST MOD 30 MIN: CPT | Performed by: NURSE PRACTITIONER

## 2025-02-18 PROCEDURE — G2211 COMPLEX E/M VISIT ADD ON: HCPCS | Performed by: NURSE PRACTITIONER

## 2025-02-18 PROCEDURE — 1157F ADVNC CARE PLAN IN RCRD: CPT | Performed by: NURSE PRACTITIONER

## 2025-02-18 PROCEDURE — 1159F MED LIST DOCD IN RCRD: CPT | Performed by: NURSE PRACTITIONER

## 2025-02-18 PROCEDURE — 3078F DIAST BP <80 MM HG: CPT | Performed by: NURSE PRACTITIONER

## 2025-02-18 PROCEDURE — 1036F TOBACCO NON-USER: CPT | Performed by: NURSE PRACTITIONER

## 2025-02-18 PROCEDURE — 3074F SYST BP LT 130 MM HG: CPT | Performed by: NURSE PRACTITIONER

## 2025-02-18 NOTE — PROGRESS NOTES
Primary Care Physician: Veronika Restrepo DO  Date of Visit: 02/18/2025  9:00 AM EST  Location of visit: [facility]     Chief Complaint:   Chief Complaint   Patient presents with    Follow-up        HPI / Summary:   Azucena Nolen is a 89 y.o. female presents for followup. She has no cardiac complaints. She will sporadically exercise on the seated elliptical. She is able to do daily activity without chest pain or dyspnea. She has not had any palpitations. She has no change in her chronic dizziness.  She has not had any recent falls.  She denies any near-syncope or syncope.  The patient denies chest pain, shortness of breath, syncope, orthopnea, paroxysmal nocturnal dyspnea, lower extremity edema, or bleeding problems.    She takes Meloxicam as needed and very occasional.               Past Medical History:  Past Medical History:   Diagnosis Date    Atherosclerotic heart disease of native coronary artery without angina pectoris 07/27/2013    Arteriosclerotic cardiovascular disease (ASCVD)    Atherosclerotic heart disease of native coronary artery without angina pectoris 08/30/2022    CAD in native artery    Atrioventricular block, complete (Multi) 11/16/2022    Third degree AV block    Atypical facial pain 04/22/2016    Atypical face pain    Disorder of thyroid, unspecified     Thyroid trouble    Dizziness and giddiness 11/19/2018    Light-headedness    Hyperlipidemia, unspecified 08/30/2022    Hyperlipidemia    Hypothyroidism, unspecified 11/18/2021    Hypothyroidism    Impacted cerumen, right ear 08/29/2019    Excessive cerumen in right ear canal    Localized swelling, mass and lump, head 04/27/2016    Facial mass    Localized swelling, mass and lump, neck 04/21/2016    Mass in neck    Other conditions influencing health status     Right handed    Other specified cough 04/19/2017    Cough with sputum    Other specified disorders of eustachian tube, left ear 09/29/2016    Dysfunction of left eustachian tube     Other specified problems related to primary support group 12/05/2017    Stress due to family tension    Pain in right leg 10/23/2015    Leg pain, bilateral    Pain in unspecified hip 12/05/2017    Joint pain, hip    Pain in unspecified knee 12/05/2017    Knee pain    Personal history of other diseases of the circulatory system     History of cardiac disorder    Personal history of other diseases of the respiratory system 04/03/2017    History of acute bronchitis    Personal history of other diseases of urinary system 09/06/2019    History of hematuria    Personal history of other specified conditions 04/27/2016    History of facial pain    Personal history of other specified conditions 04/26/2017    History of fatigue    Personal history of other specified conditions 07/30/2019    History of dysuria    Personal history of other specified conditions 09/06/2019    History of gross hematuria    Presence of cardiac pacemaker 07/23/2020    Presence of cardiac pacemaker    Rash and other nonspecific skin eruption 04/21/2016    Localized rash    Spontaneous ecchymoses     Bruises easily    Trochanteric bursitis, left hip 04/16/2018    Greater trochanteric bursitis of left hip    Urinary tract infection, site not specified 06/25/2020    Acute UTI    Urinary tract infection, site not specified 06/25/2020    Acute UTI        Past Surgical History:  Past Surgical History:   Procedure Laterality Date    CARDIAC PACEMAKER PLACEMENT  04/10/2014    Pacemaker Placement    CATARACT EXTRACTION  11/17/2014    Cataract Surgery    CORONARY ANGIOPLASTY WITH STENT PLACEMENT  04/17/2014    Cath Stent Placement    OTHER SURGICAL HISTORY  08/30/2022    Hypertension          Social History:   reports that she quit smoking about 72 years ago. Her smoking use included cigarettes. She started smoking about 82 years ago. She has a 5 pack-year smoking history. She has been exposed to tobacco smoke. She has never used smokeless tobacco. She  "reports current alcohol use of about 1.0 standard drink of alcohol per week. She reports that she does not use drugs.     Family History:  family history includes Cancer in her father and another family member; Diabetes in an other family member; Heart attack in her brother; Heart disease in her brother; No Known Problems in her mother; Pancreatic cancer in her father.      Allergies:  No Known Allergies    Outpatient Medications:  Current Outpatient Medications   Medication Instructions    ALPRAZolam (XANAX) 0.25-0.5 mg, oral, Nightly PRN    apixaban (ELIQUIS) 2.5 mg, oral, 2 times daily    aspirin 81 mg EC tablet 1 tablet, Daily    empagliflozin (JARDIANCE) 10 mg, oral, 3 times weekly    glimepiride (AMARYL) 1 mg, oral, Daily before breakfast    levothyroxine (SYNTHROID, LEVOXYL) 50 mcg, oral, Daily    lisinopril 5 mg, oral, Daily    meloxicam (MOBIC) 15 mg, oral, Daily    omeprazole OTC (PriLOSEC OTC) 20 mg EC tablet As needed    rosuvastatin (CRESTOR) 40 mg, oral, Daily       Physical Exam:  Vitals:    02/18/25 0902   BP: 118/70   BP Location: Right arm   Patient Position: Sitting   Pulse: 71   SpO2: 98%   Weight: 78.9 kg (173 lb 14.4 oz)   Height: 1.626 m (5' 4\")     Wt Readings from Last 5 Encounters:   02/18/25 78.9 kg (173 lb 14.4 oz)   01/17/25 79.3 kg (174 lb 12.8 oz)   09/30/24 80.5 kg (177 lb 6.4 oz)   08/13/24 79.4 kg (175 lb)   06/04/24 80.8 kg (178 lb 3.2 oz)     Body mass index is 29.85 kg/m².     GENERAL: alert, cooperative, pleasant, in no acute distress  SKIN: warm and dry  NECK: Normal JVD, negative HJR  CARDIAC: Regular rate and rhythm with no rubs, murmurs, or gallops  CHEST: Normal respiratory efforts, lungs clear to auscultation bilaterally.  ABDOMEN: soft, nontender, nondistended  EXTREMITIES: no edema, +2 palpable RP bilaterally and +2 left DP and +1 right DP pulse    Last Labs:  Recent Labs     05/28/24  1049 05/15/23  0939 01/31/23  1011   WBC 10.1 10.0 9.9   HGB 12.6 12.4 12.9   HCT 39.3 " 39.0 39.3    162 195   MCV 95 96 94     Recent Labs     01/09/25  0914 09/25/24  1337 05/28/24  1049    138 139   K 4.0 4.3 4.2    105 105   BUN 22 24* 22   CREATININE 1.38* 1.43* 1.55*     CMP -  Lab Results   Component Value Date    CALCIUM 9.3 01/09/2025    PROT 7.1 05/28/2024    ALBUMIN 4.3 05/28/2024    AST 18 05/28/2024    ALT 20 05/28/2024    ALKPHOS 57 05/28/2024    BILITOT 0.5 05/28/2024       LIPID PANEL -   Lab Results   Component Value Date    CHOL 134 05/28/2024    HDL 49.3 05/28/2024    LDLF 35 05/15/2023    TRIG 194 (H) 05/28/2024   LDL 46 5/28/24    Lab Results   Component Value Date    HGBA1C 7.5 (H) 01/09/2025    HGBA1C 7.8 (A) 12/13/2023       Last Cardiology Tests:  ECG:  Obtained and reviewed EKG- normal sinus rhythm HR 71, LAD, non specific intraventricular block, possible inferior infarct    Echo:  Echocardiogram February 17, 2021  CONCLUSIONS:   1. The left ventricular systolic function is normal with a 65-70% estimated ejection fraction.   2. Spectral Doppler shows an impaired relaxation pattern of left ventricular diastolic filling.   3. The left ventricular cavity size is decreased.        Cath:        Stress Test:  Exercise nuclear stress test May 2017  Summary:   1. No clinical or electrocardiographic evidence for ischemia at a maximal workload.   2. Nuclear image results are reported separately.   3. The adequate level of stress was achieved.  IMPRESSION:  1. Normal stress myocardial perfusion imaging.  2. Well-maintained left ventricular function.           Cardiac Imaging:        Assessment/Plan   Azucena was seen today for follow-up.  Diagnoses and all orders for this visit:  Presence of cardiac pacemaker (Primary)  -     ECG 12 lead (Clinic Performed)  Paroxysmal atrial fibrillation (Multi)  -     ECG 12 lead (Clinic Performed)  -     CBC; Future  -     CBC  CAD in native artery  Primary hypertension  Pure hypercholesterolemia        In summary, Ms. Nolen is a  pleasant 89 year-old white female with a past medical history significant for coronary artery disease status post PCI, complete heart block status post permanent pacemaker placement, hyperlipidemia, hypertension, type II non-insulin-requiring diabetes, and paroxysmal atrial fibrillation.  She is relatively asymptomatic from a cardiac perspective. She appears euvolemic on exam.  Given her age, CKD, and need for aspirin we will keep her on Eliquis 2.5 mg. I have ordered CBC as above. She should continue her current cardiovascular medications. We will see her back in follow-up in 6 months.       Orders:  No orders of the defined types were placed in this encounter.     Followup Appts:  Future Appointments   Date Time Provider Department Center   6/11/2025  9:30 AM Veronika Restrepo DO [id number] [facility]   8/19/2025  9:00 AM Jose Angel Pace MD [id number] [facility]           ____________________________________________________________  Tnoia Travis, APRN-CNP  [facility]  [facility]

## 2025-02-19 LAB
ERYTHROCYTE [DISTWIDTH] IN BLOOD BY AUTOMATED COUNT: 12.4 % (ref 11–15)
HCT VFR BLD AUTO: 39.5 % (ref 35–45)
HGB BLD-MCNC: 12.7 G/DL (ref 11.7–15.5)
MCH RBC QN AUTO: 30.4 PG (ref 27–33)
MCHC RBC AUTO-ENTMCNC: 32.2 G/DL (ref 32–36)
MCV RBC AUTO: 94.5 FL (ref 80–100)
PLATELET # BLD AUTO: 162 THOUSAND/UL (ref 140–400)
PMV BLD REES-ECKER: 10.9 FL (ref 7.5–12.5)
RBC # BLD AUTO: 4.18 MILLION/UL (ref 3.8–5.1)
WBC # BLD AUTO: 8.9 THOUSAND/UL (ref 3.8–10.8)

## 2025-04-24 ENCOUNTER — HOSPITAL ENCOUNTER (OUTPATIENT)
Dept: CARDIOLOGY | Facility: CLINIC | Age: OVER 89
Discharge: HOME | End: 2025-04-24
Payer: MEDICARE

## 2025-04-24 DIAGNOSIS — Z95.0 PRESENCE OF CARDIAC PACEMAKER: ICD-10-CM

## 2025-04-24 DIAGNOSIS — I44.2 ATRIOVENTRICULAR BLOCK, COMPLETE (MULTI): ICD-10-CM

## 2025-04-24 PROCEDURE — 93296 REM INTERROG EVL PM/IDS: CPT

## 2025-05-16 DIAGNOSIS — B37.31 VAGINAL CANDIDIASIS: Primary | ICD-10-CM

## 2025-05-16 RX ORDER — FLUCONAZOLE 150 MG/1
150 TABLET ORAL ONCE
Qty: 1 TABLET | Refills: 0 | Status: SHIPPED | OUTPATIENT
Start: 2025-05-16 | End: 2025-05-16

## 2025-05-28 DIAGNOSIS — E11.29 DIABETES MELLITUS WITH MICROALBUMINURIA (MULTI): Primary | ICD-10-CM

## 2025-05-28 DIAGNOSIS — Z13.6 ENCOUNTER FOR SCREENING FOR CORONARY ARTERY DISEASE: ICD-10-CM

## 2025-05-28 DIAGNOSIS — R80.9 DIABETES MELLITUS WITH MICROALBUMINURIA (MULTI): Primary | ICD-10-CM

## 2025-05-28 DIAGNOSIS — E78.2 MIXED HYPERLIPIDEMIA: ICD-10-CM

## 2025-05-28 DIAGNOSIS — E03.9 HYPOTHYROIDISM, UNSPECIFIED TYPE: ICD-10-CM

## 2025-06-01 ENCOUNTER — PATIENT MESSAGE (OUTPATIENT)
Dept: CARDIOLOGY | Facility: HOSPITAL | Age: OVER 89
End: 2025-06-01
Payer: MEDICARE

## 2025-06-01 DIAGNOSIS — I48.0 PAROXYSMAL ATRIAL FIBRILLATION (MULTI): ICD-10-CM

## 2025-06-05 LAB
ALBUMIN SERPL-MCNC: 4.4 G/DL (ref 3.6–5.1)
ALP SERPL-CCNC: 61 U/L (ref 37–153)
ALT SERPL-CCNC: 17 U/L (ref 6–29)
ANION GAP SERPL CALCULATED.4IONS-SCNC: 11 MMOL/L (CALC) (ref 7–17)
AST SERPL-CCNC: 16 U/L (ref 10–35)
BILIRUB SERPL-MCNC: 0.5 MG/DL (ref 0.2–1.2)
BUN SERPL-MCNC: 24 MG/DL (ref 7–25)
CALCIUM SERPL-MCNC: 9.5 MG/DL (ref 8.6–10.4)
CHLORIDE SERPL-SCNC: 103 MMOL/L (ref 98–110)
CHOLEST SERPL-MCNC: 147 MG/DL
CHOLEST/HDLC SERPL: 2.8 (CALC)
CO2 SERPL-SCNC: 26 MMOL/L (ref 20–32)
CREAT SERPL-MCNC: 1.22 MG/DL (ref 0.6–0.95)
CREAT UR-MCNC: NORMAL MG/DL
EGFRCR SERPLBLD CKD-EPI 2021: 41 ML/MIN/1.73M2
ERYTHROCYTE [DISTWIDTH] IN BLOOD BY AUTOMATED COUNT: 12.7 % (ref 11–15)
EST. AVERAGE GLUCOSE BLD GHB EST-MCNC: 194 MG/DL
EST. AVERAGE GLUCOSE BLD GHB EST-SCNC: 10.8 MMOL/L
GLUCOSE SERPL-MCNC: 206 MG/DL (ref 65–99)
HBA1C MFR BLD: 8.4 %
HCT VFR BLD AUTO: 41.4 % (ref 35–45)
HDLC SERPL-MCNC: 53 MG/DL
HGB BLD-MCNC: 13 G/DL (ref 11.7–15.5)
LDLC SERPL CALC-MCNC: 67 MG/DL (CALC)
MCH RBC QN AUTO: 29.9 PG (ref 27–33)
MCHC RBC AUTO-ENTMCNC: 31.4 G/DL (ref 32–36)
MCV RBC AUTO: 95.2 FL (ref 80–100)
MICROALBUMIN UR-MCNC: NORMAL MG/DL
NONHDLC SERPL-MCNC: 94 MG/DL (CALC)
PLATELET # BLD AUTO: 167 THOUSAND/UL (ref 140–400)
PMV BLD REES-ECKER: 10.5 FL (ref 7.5–12.5)
POTASSIUM SERPL-SCNC: 4 MMOL/L (ref 3.5–5.3)
PROT SERPL-MCNC: 7.2 G/DL (ref 6.1–8.1)
RBC # BLD AUTO: 4.35 MILLION/UL (ref 3.8–5.1)
SODIUM SERPL-SCNC: 140 MMOL/L (ref 135–146)
TRIGL SERPL-MCNC: 203 MG/DL
TSH SERPL-ACNC: 5.38 MIU/L (ref 0.4–4.5)
WBC # BLD AUTO: 8.4 THOUSAND/UL (ref 3.8–10.8)

## 2025-06-07 DIAGNOSIS — E11.29 DIABETES MELLITUS WITH MICROALBUMINURIA (MULTI): ICD-10-CM

## 2025-06-07 DIAGNOSIS — R80.9 DIABETES MELLITUS WITH MICROALBUMINURIA (MULTI): ICD-10-CM

## 2025-06-07 DIAGNOSIS — E03.9 HYPOTHYROIDISM, UNSPECIFIED TYPE: ICD-10-CM

## 2025-06-07 DIAGNOSIS — E78.00 PURE HYPERCHOLESTEROLEMIA: ICD-10-CM

## 2025-06-10 ASSESSMENT — ENCOUNTER SYMPTOMS
NERVOUS/ANXIOUS: 0
NUMBNESS: 0
NAUSEA: 0
DYSPHORIC MOOD: 0
FATIGUE: 0
HEMATURIA: 0
HEADACHES: 0
CHILLS: 0
FEVER: 0
CONSTIPATION: 0
DYSURIA: 0
PALPITATIONS: 0
ABDOMINAL PAIN: 0
SHORTNESS OF BREATH: 0
DIZZINESS: 0
COUGH: 0
DIARRHEA: 0
FREQUENCY: 0
WHEEZING: 0

## 2025-06-10 NOTE — PROGRESS NOTES
Subjective   Patient ID: Azucena Nolen is a 89 y.o. female who presents for Follow-up (Blood sugars lab results).    Diabetes  Pertinent negatives for hypoglycemia include no dizziness, headaches or nervousness/anxiousness. Pertinent negatives for diabetes include no chest pain and no fatigue.      Follow up of diabetes.  Current treatments: Jardiance 3 times a week, glimepiride 1 mg daily.  Takes Jardiance 3 times a week because of cost.  Not on metformin because of kidney function. Home sugars: not doing.  Statin: Yes rosuvastatin ACE/ARB: Yes lisinopril.  Most recent A1c was 8.4 previously 7.5 in January.  Fasting blood sugar was 206.  GFR 41.  Stable. Jardiance daily caused a yeast infection.    TSH slightly elevated at 5.38, currently on 50 mcg of levothyroxine.    Follow-up of  hypertension. Current medications ACE Inhibitor. Home blood pressure readings: not doing.   Associated signs and symptoms: none. Patient denies: blurred vision, chest pain, dyspnea, headache, and palpitations. Use of agents associated with hypertension: NSAIDS and thyroid hormones. Medication compliance: taking as prescribed.     Started a topical OTC estrogen cream with plant based estrogen.     She is updating her estate planning. She needs a letter stating she is of sound mind b/c of memory changes typically at her age.     Review of Systems   Constitutional:  Negative for chills, fatigue and fever.   Respiratory:  Negative for cough, shortness of breath and wheezing.    Cardiovascular:  Negative for chest pain, palpitations and leg swelling.   Gastrointestinal:  Negative for abdominal pain, constipation, diarrhea and nausea.   Genitourinary:  Negative for dysuria, frequency, hematuria and urgency.   Neurological:  Negative for dizziness, numbness and headaches.   Psychiatric/Behavioral:  Negative for dysphoric mood. The patient is not nervous/anxious.        Objective   /70 (BP Location: Right arm, Patient Position: Sitting, BP  Cuff Size: Adult)   Pulse 71   Temp 36.5 °C (97.7 °F) (Temporal)   Wt 79.7 kg (175 lb 12.8 oz)   SpO2 97%   BMI 30.18 kg/m²     Physical Exam  Constitutional:       Appearance: Normal appearance.   HENT:      Head: Normocephalic and atraumatic.   Eyes:      Extraocular Movements: Extraocular movements intact.      Pupils: Pupils are equal, round, and reactive to light.   Cardiovascular:      Rate and Rhythm: Normal rate and regular rhythm.      Heart sounds: Normal heart sounds. No murmur heard.  Pulmonary:      Effort: Pulmonary effort is normal.      Breath sounds: Normal breath sounds. No wheezing.   Abdominal:      General: Bowel sounds are normal.      Palpations: Abdomen is soft.      Tenderness: There is no abdominal tenderness. There is no guarding.   Musculoskeletal:         General: Normal range of motion.   Skin:     General: Skin is warm and dry.   Neurological:      General: No focal deficit present.      Mental Status: She is alert and oriented to person, place, and time.   Psychiatric:         Mood and Affect: Mood normal.         Behavior: Behavior normal.         Assessment/Plan   Problem List Items Addressed This Visit       Diabetes mellitus with microalbuminuria (Multi) - Primary    Relevant Medications    empagliflozin (Jardiance) 10 mg tablet    glimepiride (Amaryl) 1 mg tablet    Other Relevant Orders    Comprehensive Metabolic Panel    Albumin-Creatinine Ratio, Urine Random    Hyperlipidemia    Relevant Medications    rosuvastatin (Crestor) 40 mg tablet    Hypertension    Relevant Orders    Comprehensive Metabolic Panel    Hypothyroidism    Relevant Medications    levothyroxine (Synthroid, Levoxyl) 50 mcg tablet    Other Relevant Orders    TSH with reflex to Free T4 if abnormal     Other Visit Diagnoses         CKD stage 3 secondary to diabetes (Multi)        Relevant Medications    empagliflozin (Jardiance) 10 mg tablet      Type 2 diabetes mellitus with other diabetic kidney  complication        Relevant Medications    lisinopril 5 mg tablet      Proteinuria, unspecified        Relevant Medications    lisinopril 5 mg tablet                Will have her continue her current medications.  Her A1c did go back up.  We discussed limiting her carbohydrate intake.  Will have her continue Jardiance 3 times a week.  May want to consider going off of glimepiride if her sugars are going low.    Will have her recheck blood work prior to seeing her back for her Medicare physical.  Will recheck kidney function, blood sugar, and A1c in addition to her urine micro.    I also wrote a letter for her today stating that as of today she is of sound mind to make her own decisions in terms of medical, personal, and legal affairs.         Patient understands and agrees with treatment plan    Veronika Restrepo, DO   06/11/25

## 2025-06-11 ENCOUNTER — APPOINTMENT (OUTPATIENT)
Dept: PRIMARY CARE | Facility: CLINIC | Age: OVER 89
End: 2025-06-11
Payer: MEDICARE

## 2025-06-11 VITALS
WEIGHT: 175.8 LBS | TEMPERATURE: 97.7 F | OXYGEN SATURATION: 97 % | BODY MASS INDEX: 30.18 KG/M2 | DIASTOLIC BLOOD PRESSURE: 70 MMHG | HEART RATE: 71 BPM | SYSTOLIC BLOOD PRESSURE: 120 MMHG

## 2025-06-11 DIAGNOSIS — E11.29 DIABETES MELLITUS WITH MICROALBUMINURIA (MULTI): Primary | ICD-10-CM

## 2025-06-11 DIAGNOSIS — N18.32 CHRONIC KIDNEY DISEASE, STAGE 3B (MULTI): ICD-10-CM

## 2025-06-11 DIAGNOSIS — E11.22 CKD STAGE 3 SECONDARY TO DIABETES (MULTI): ICD-10-CM

## 2025-06-11 DIAGNOSIS — R80.9 DIABETES MELLITUS WITH MICROALBUMINURIA (MULTI): Primary | ICD-10-CM

## 2025-06-11 DIAGNOSIS — N18.30 CKD STAGE 3 SECONDARY TO DIABETES (MULTI): ICD-10-CM

## 2025-06-11 DIAGNOSIS — E11.29 TYPE 2 DIABETES MELLITUS WITH OTHER DIABETIC KIDNEY COMPLICATION: ICD-10-CM

## 2025-06-11 DIAGNOSIS — I10 PRIMARY HYPERTENSION: ICD-10-CM

## 2025-06-11 DIAGNOSIS — E78.00 PURE HYPERCHOLESTEROLEMIA: ICD-10-CM

## 2025-06-11 DIAGNOSIS — E03.9 HYPOTHYROIDISM, UNSPECIFIED TYPE: ICD-10-CM

## 2025-06-11 DIAGNOSIS — R80.9 PROTEINURIA, UNSPECIFIED: ICD-10-CM

## 2025-06-11 PROCEDURE — 99214 OFFICE O/P EST MOD 30 MIN: CPT | Performed by: STUDENT IN AN ORGANIZED HEALTH CARE EDUCATION/TRAINING PROGRAM

## 2025-06-11 PROCEDURE — 1159F MED LIST DOCD IN RCRD: CPT | Performed by: STUDENT IN AN ORGANIZED HEALTH CARE EDUCATION/TRAINING PROGRAM

## 2025-06-11 PROCEDURE — 3074F SYST BP LT 130 MM HG: CPT | Performed by: STUDENT IN AN ORGANIZED HEALTH CARE EDUCATION/TRAINING PROGRAM

## 2025-06-11 PROCEDURE — 3078F DIAST BP <80 MM HG: CPT | Performed by: STUDENT IN AN ORGANIZED HEALTH CARE EDUCATION/TRAINING PROGRAM

## 2025-06-11 PROCEDURE — 1123F ACP DISCUSS/DSCN MKR DOCD: CPT | Performed by: STUDENT IN AN ORGANIZED HEALTH CARE EDUCATION/TRAINING PROGRAM

## 2025-06-11 PROCEDURE — 1036F TOBACCO NON-USER: CPT | Performed by: STUDENT IN AN ORGANIZED HEALTH CARE EDUCATION/TRAINING PROGRAM

## 2025-06-11 PROCEDURE — G2211 COMPLEX E/M VISIT ADD ON: HCPCS | Performed by: STUDENT IN AN ORGANIZED HEALTH CARE EDUCATION/TRAINING PROGRAM

## 2025-06-11 RX ORDER — LEVOTHYROXINE SODIUM 50 UG/1
50 TABLET ORAL DAILY
Qty: 90 TABLET | Refills: 3 | OUTPATIENT
Start: 2025-06-11

## 2025-06-11 RX ORDER — ROSUVASTATIN CALCIUM 40 MG/1
40 TABLET, COATED ORAL DAILY
Qty: 90 TABLET | Refills: 3 | Status: SHIPPED | OUTPATIENT
Start: 2025-06-11

## 2025-06-11 RX ORDER — LEVOTHYROXINE SODIUM 50 UG/1
50 TABLET ORAL DAILY
Qty: 90 TABLET | Refills: 1 | Status: SHIPPED | OUTPATIENT
Start: 2025-06-11

## 2025-06-11 RX ORDER — GLIMEPIRIDE 1 MG/1
1 TABLET ORAL
Qty: 90 TABLET | Refills: 3 | OUTPATIENT
Start: 2025-06-11

## 2025-06-11 RX ORDER — ROSUVASTATIN CALCIUM 40 MG/1
40 TABLET, COATED ORAL DAILY
Qty: 90 TABLET | Refills: 3 | OUTPATIENT
Start: 2025-06-11

## 2025-06-11 RX ORDER — LISINOPRIL 5 MG/1
5 TABLET ORAL DAILY
Qty: 90 TABLET | Refills: 1 | Status: SHIPPED | OUTPATIENT
Start: 2025-06-11

## 2025-06-11 RX ORDER — GLIMEPIRIDE 1 MG/1
1 TABLET ORAL
Qty: 90 TABLET | Refills: 3 | Status: SHIPPED | OUTPATIENT
Start: 2025-06-11 | End: 2026-06-11

## 2025-06-11 ASSESSMENT — ANXIETY QUESTIONNAIRES
GAD7 TOTAL SCORE: 2
4. TROUBLE RELAXING: NOT AT ALL
IF YOU CHECKED OFF ANY PROBLEMS ON THIS QUESTIONNAIRE, HOW DIFFICULT HAVE THESE PROBLEMS MADE IT FOR YOU TO DO YOUR WORK, TAKE CARE OF THINGS AT HOME, OR GET ALONG WITH OTHER PEOPLE: NOT DIFFICULT AT ALL
1. FEELING NERVOUS, ANXIOUS, OR ON EDGE: SEVERAL DAYS
5. BEING SO RESTLESS THAT IT IS HARD TO SIT STILL: NOT AT ALL
2. NOT BEING ABLE TO STOP OR CONTROL WORRYING: NOT AT ALL
6. BECOMING EASILY ANNOYED OR IRRITABLE: NOT AT ALL
7. FEELING AFRAID AS IF SOMETHING AWFUL MIGHT HAPPEN: NOT AT ALL
3. WORRYING TOO MUCH ABOUT DIFFERENT THINGS: SEVERAL DAYS

## 2025-06-11 ASSESSMENT — PATIENT HEALTH QUESTIONNAIRE - PHQ9
6. FEELING BAD ABOUT YOURSELF - OR THAT YOU ARE A FAILURE OR HAVE LET YOURSELF OR YOUR FAMILY DOWN: SEVERAL DAYS
1. LITTLE INTEREST OR PLEASURE IN DOING THINGS: NOT AT ALL
2. FEELING DOWN, DEPRESSED OR HOPELESS: SEVERAL DAYS
SUM OF ALL RESPONSES TO PHQ9 QUESTIONS 1 AND 2: 1

## 2025-06-11 ASSESSMENT — ENCOUNTER SYMPTOMS
DEPRESSION: 1
OCCASIONAL FEELINGS OF UNSTEADINESS: 1

## 2025-06-11 NOTE — LETTER
To Whom It May Concern,    I am writing to confirm that I have evaluated my patient, Azucena Nolen, found her to be of sound mind. Based on my clinical assessment, she is oriented to person, place, and time, and demonstrates the capacity to understand, appreciate, and make informed decisions regarding her personal, medical, and legal affairs.    At this time, there is no evidence of cognitive impairment or mental health condition that would interfere with her ability to make decisions independently.    If you require additional information, please contact my office.    Sincerely,        Veronika Restrepo, DO

## 2025-06-13 LAB
ALBUMIN/CREAT UR: 85 MG/G CREAT
CREAT UR-MCNC: 67 MG/DL (ref 20–275)
MICROALBUMIN UR-MCNC: 5.7 MG/DL

## 2025-08-08 ENCOUNTER — APPOINTMENT (OUTPATIENT)
Dept: PRIMARY CARE | Facility: CLINIC | Age: OVER 89
End: 2025-08-08
Payer: MEDICARE

## 2025-08-08 VITALS
HEART RATE: 87 BPM | SYSTOLIC BLOOD PRESSURE: 120 MMHG | DIASTOLIC BLOOD PRESSURE: 66 MMHG | BODY MASS INDEX: 30.38 KG/M2 | OXYGEN SATURATION: 96 % | WEIGHT: 177 LBS | TEMPERATURE: 97.5 F

## 2025-08-08 DIAGNOSIS — E03.9 HYPOTHYROIDISM, UNSPECIFIED TYPE: ICD-10-CM

## 2025-08-08 DIAGNOSIS — E11.29 TYPE 2 DIABETES MELLITUS WITH OTHER DIABETIC KIDNEY COMPLICATION: ICD-10-CM

## 2025-08-08 DIAGNOSIS — M26.609 TMJ (TEMPOROMANDIBULAR JOINT SYNDROME): ICD-10-CM

## 2025-08-08 DIAGNOSIS — R68.84 JAW PAIN: Primary | ICD-10-CM

## 2025-08-08 PROBLEM — Z85.818 HISTORY OF MALIGNANT NEOPLASM OF PAROTID GLAND: Status: ACTIVE | Noted: 2025-08-08

## 2025-08-08 PROCEDURE — 3074F SYST BP LT 130 MM HG: CPT | Performed by: STUDENT IN AN ORGANIZED HEALTH CARE EDUCATION/TRAINING PROGRAM

## 2025-08-08 PROCEDURE — 1159F MED LIST DOCD IN RCRD: CPT | Performed by: STUDENT IN AN ORGANIZED HEALTH CARE EDUCATION/TRAINING PROGRAM

## 2025-08-08 PROCEDURE — 99214 OFFICE O/P EST MOD 30 MIN: CPT | Performed by: STUDENT IN AN ORGANIZED HEALTH CARE EDUCATION/TRAINING PROGRAM

## 2025-08-08 PROCEDURE — 3078F DIAST BP <80 MM HG: CPT | Performed by: STUDENT IN AN ORGANIZED HEALTH CARE EDUCATION/TRAINING PROGRAM

## 2025-08-08 RX ORDER — PREDNISONE 20 MG/1
20 TABLET ORAL
Qty: 5 TABLET | Refills: 0 | Status: SHIPPED | OUTPATIENT
Start: 2025-08-08 | End: 2025-08-13

## 2025-08-08 RX ORDER — FLUTICASONE PROPIONATE 50 MCG
2 SPRAY, SUSPENSION (ML) NASAL DAILY
Qty: 16 G | Refills: 5 | Status: SHIPPED | OUTPATIENT
Start: 2025-08-08 | End: 2026-08-08

## 2025-08-08 ASSESSMENT — ENCOUNTER SYMPTOMS
DIARRHEA: 0
HEMATURIA: 0
DYSPHORIC MOOD: 0
FATIGUE: 0
HEADACHES: 0
PALPITATIONS: 0
CONSTIPATION: 0
WHEEZING: 0
CHILLS: 0
SHORTNESS OF BREATH: 0
DYSURIA: 0
NUMBNESS: 0
NAUSEA: 0
FEVER: 0
DIZZINESS: 0
FREQUENCY: 0
NERVOUS/ANXIOUS: 0
ABDOMINAL PAIN: 0
COUGH: 0

## 2025-08-08 NOTE — PROGRESS NOTES
Subjective   Patient ID: Azucena Nolen is a 89 y.o. female who presents for Jaw Pain (Right sided, a couple months, hurts chewing/swallowing).    HPI   Patient here for R jaw pain x several months.  Initially had a lump but that is gone still sore.   Pain on the jaw itself, just under it, and back to the ear. Swallowing and chewing make it worse. No pain when she talks.  Hasn't done anything.  It was really bad when it started, had cheek pain and soreness that was severe. That has improved.   She saw her dentist and she wanted her to see an oral surgeon.   She had a tooth pulled since this started and that didn't make it better. No infection. Has had radiation in jaw and face from parotid in addition to chemo. No swelling. No fever or chills.     Review of Systems   Constitutional:  Negative for chills, fatigue and fever.   Respiratory:  Negative for cough, shortness of breath and wheezing.    Cardiovascular:  Negative for chest pain, palpitations and leg swelling.   Gastrointestinal:  Negative for abdominal pain, constipation, diarrhea and nausea.   Genitourinary:  Negative for dysuria, frequency, hematuria and urgency.   Neurological:  Negative for dizziness, numbness and headaches.   Psychiatric/Behavioral:  Negative for dysphoric mood. The patient is not nervous/anxious.        Objective   /66 (BP Location: Left arm, Patient Position: Sitting, BP Cuff Size: Adult)   Pulse 87   Temp 36.4 °C (97.5 °F) (Temporal)   Wt 80.3 kg (177 lb)   SpO2 96%   BMI 30.38 kg/m²     Physical Exam  Constitutional:       Appearance: Normal appearance.   HENT:      Head: Normocephalic and atraumatic.      Jaw: Tenderness present.        Mouth/Throat:      Mouth: Mucous membranes are moist. No oral lesions.      Dentition: No dental tenderness, dental abscesses or gum lesions.      Tongue: No lesions.      Pharynx: No pharyngeal swelling or posterior oropharyngeal erythema.     Eyes:      Extraocular Movements: Extraocular  movements intact.      Pupils: Pupils are equal, round, and reactive to light.       Cardiovascular:      Rate and Rhythm: Normal rate and regular rhythm.      Heart sounds: Normal heart sounds. No murmur heard.  Pulmonary:      Effort: Pulmonary effort is normal.      Breath sounds: Normal breath sounds. No wheezing.     Musculoskeletal:         General: Normal range of motion.     Skin:     General: Skin is warm and dry.     Neurological:      General: No focal deficit present.      Mental Status: She is alert and oriented to person, place, and time.     Psychiatric:         Mood and Affect: Mood normal.         Behavior: Behavior normal.         Assessment/Plan   Problem List Items Addressed This Visit       Hypothyroidism    Relevant Medications    predniSONE (Deltasone) 20 mg tablet    Other Relevant Orders    TSH with reflex to Free T4 if abnormal     Other Visit Diagnoses         Jaw pain    -  Primary    Relevant Medications    fluticasone (Flonase) 50 mcg/actuation nasal spray    Other Relevant Orders    Referral to ENT      TMJ (temporomandibular joint syndrome)        Relevant Medications    predniSONE (Deltasone) 20 mg tablet    Other Relevant Orders    Referral to Physical Therapy      Type 2 diabetes mellitus with other diabetic kidney complication        Relevant Orders    Comprehensive Metabolic Panel    Hemoglobin A1C          Patient does have pain right on her mandible.  I do not notice any lymphadenopathy.  The nerve pain that she had on her cheek has resolved so less likely trigeminal neuralgia at this point.  She does have a complicated history as having had radiation on her parotid gland in the past.  Current plan will be referral to ear nose and throat, prednisone, and TMJ physical therapy.  Offered imaging but she would like to start with this at this time.  She does have some radiation of pain to her right ear so we will also have her start Flonase.  I already see her back next month so we  can follow-up at that time as well    Veronika Restrepo, DO  8/8/2025

## 2025-08-11 ENCOUNTER — HOSPITAL ENCOUNTER (OUTPATIENT)
Dept: CARDIOLOGY | Facility: CLINIC | Age: OVER 89
Discharge: HOME | End: 2025-08-11
Payer: MEDICARE

## 2025-08-11 DIAGNOSIS — Z95.0 PRESENCE OF CARDIAC PACEMAKER: ICD-10-CM

## 2025-08-11 DIAGNOSIS — I44.2 ATRIOVENTRICULAR BLOCK, COMPLETE (MULTI): ICD-10-CM

## 2025-08-11 PROCEDURE — 93296 REM INTERROG EVL PM/IDS: CPT

## 2025-08-13 ENCOUNTER — TELEPHONE (OUTPATIENT)
Dept: PRIMARY CARE | Facility: CLINIC | Age: OVER 89
End: 2025-08-13
Payer: MEDICARE

## 2025-08-18 ENCOUNTER — APPOINTMENT (OUTPATIENT)
Dept: PRIMARY CARE | Facility: CLINIC | Age: OVER 89
End: 2025-08-18
Payer: MEDICARE

## 2025-08-18 VITALS
TEMPERATURE: 97.3 F | SYSTOLIC BLOOD PRESSURE: 136 MMHG | BODY MASS INDEX: 29.42 KG/M2 | OXYGEN SATURATION: 96 % | DIASTOLIC BLOOD PRESSURE: 62 MMHG | HEART RATE: 90 BPM | WEIGHT: 171.4 LBS

## 2025-08-18 DIAGNOSIS — R30.0 BURNING WITH URINATION: ICD-10-CM

## 2025-08-18 DIAGNOSIS — N39.45 CONTINUOUS LEAKAGE OF URINE: ICD-10-CM

## 2025-08-18 DIAGNOSIS — N89.8 VAGINAL IRRITATION: Primary | ICD-10-CM

## 2025-08-18 LAB
POC APPEARANCE, URINE: CLEAR
POC BILIRUBIN, URINE: NEGATIVE
POC BLOOD, URINE: ABNORMAL
POC COLOR, URINE: YELLOW
POC GLUCOSE, URINE: ABNORMAL MG/DL
POC KETONES, URINE: NEGATIVE MG/DL
POC LEUKOCYTES, URINE: ABNORMAL
POC NITRITE,URINE: NEGATIVE
POC PH, URINE: 5.5 PH
POC PROTEIN, URINE: ABNORMAL MG/DL
POC SPECIFIC GRAVITY, URINE: 1.02
POC UROBILINOGEN, URINE: 0.2 EU/DL

## 2025-08-18 PROCEDURE — 1160F RVW MEDS BY RX/DR IN RCRD: CPT | Performed by: NURSE PRACTITIONER

## 2025-08-18 PROCEDURE — 1159F MED LIST DOCD IN RCRD: CPT | Performed by: NURSE PRACTITIONER

## 2025-08-18 PROCEDURE — 81003 URINALYSIS AUTO W/O SCOPE: CPT | Performed by: NURSE PRACTITIONER

## 2025-08-18 PROCEDURE — 99214 OFFICE O/P EST MOD 30 MIN: CPT | Performed by: NURSE PRACTITIONER

## 2025-08-18 PROCEDURE — 1158F ADVNC CARE PLAN TLK DOCD: CPT | Performed by: NURSE PRACTITIONER

## 2025-08-18 PROCEDURE — 3078F DIAST BP <80 MM HG: CPT | Performed by: NURSE PRACTITIONER

## 2025-08-18 PROCEDURE — 3075F SYST BP GE 130 - 139MM HG: CPT | Performed by: NURSE PRACTITIONER

## 2025-08-18 ASSESSMENT — ENCOUNTER SYMPTOMS
CONSTIPATION: 0
DYSURIA: 1
CHILLS: 0
NAUSEA: 0
VOMITING: 0
FEVER: 0
HEMATURIA: 1
ABDOMINAL PAIN: 0
DIARRHEA: 0
FATIGUE: 0

## 2025-08-19 ENCOUNTER — OFFICE VISIT (OUTPATIENT)
Dept: CARDIOLOGY | Facility: HOSPITAL | Age: OVER 89
End: 2025-08-19
Payer: MEDICARE

## 2025-08-19 VITALS
DIASTOLIC BLOOD PRESSURE: 59 MMHG | BODY MASS INDEX: 29.02 KG/M2 | SYSTOLIC BLOOD PRESSURE: 128 MMHG | OXYGEN SATURATION: 98 % | WEIGHT: 170 LBS | HEART RATE: 86 BPM | HEIGHT: 64 IN

## 2025-08-19 DIAGNOSIS — B37.31 CANDIDA VAGINITIS: Primary | ICD-10-CM

## 2025-08-19 DIAGNOSIS — Z95.0 PRESENCE OF CARDIAC PACEMAKER: ICD-10-CM

## 2025-08-19 DIAGNOSIS — I25.10 CAD IN NATIVE ARTERY: Primary | ICD-10-CM

## 2025-08-19 DIAGNOSIS — E78.00 PURE HYPERCHOLESTEROLEMIA: ICD-10-CM

## 2025-08-19 DIAGNOSIS — I10 PRIMARY HYPERTENSION: ICD-10-CM

## 2025-08-19 DIAGNOSIS — I48.91 ATRIAL FIBRILLATION, UNSPECIFIED TYPE (MULTI): ICD-10-CM

## 2025-08-19 LAB
ATRIAL RATE: 86 BPM
BV SCORE VAG QL: NORMAL
P AXIS: 75 DEGREES
P OFFSET: 174 MS
P ONSET: 117 MS
PR INTERVAL: 152 MS
Q ONSET: 193 MS
QRS COUNT: 14 BEATS
QRS DURATION: 142 MS
QT INTERVAL: 424 MS
QTC CALCULATION(BAZETT): 507 MS
QTC FREDERICIA: 478 MS
R AXIS: -85 DEGREES
T AXIS: 86 DEGREES
T OFFSET: 405 MS
VENTRICULAR RATE: 86 BPM

## 2025-08-19 PROCEDURE — 93005 ELECTROCARDIOGRAM TRACING: CPT | Performed by: INTERNAL MEDICINE

## 2025-08-19 PROCEDURE — G2211 COMPLEX E/M VISIT ADD ON: HCPCS | Performed by: INTERNAL MEDICINE

## 2025-08-19 PROCEDURE — 1159F MED LIST DOCD IN RCRD: CPT | Performed by: INTERNAL MEDICINE

## 2025-08-19 PROCEDURE — 99212 OFFICE O/P EST SF 10 MIN: CPT

## 2025-08-19 PROCEDURE — 3074F SYST BP LT 130 MM HG: CPT | Performed by: INTERNAL MEDICINE

## 2025-08-19 PROCEDURE — 1160F RVW MEDS BY RX/DR IN RCRD: CPT | Performed by: INTERNAL MEDICINE

## 2025-08-19 PROCEDURE — 99214 OFFICE O/P EST MOD 30 MIN: CPT | Performed by: INTERNAL MEDICINE

## 2025-08-19 PROCEDURE — 3078F DIAST BP <80 MM HG: CPT | Performed by: INTERNAL MEDICINE

## 2025-08-20 LAB — BACTERIA UR CULT: NORMAL

## 2025-08-20 RX ORDER — TERCONAZOLE 8 MG/G
1 CREAM VAGINAL NIGHTLY
Qty: 20 G | Refills: 0 | Status: SHIPPED | OUTPATIENT
Start: 2025-08-20 | End: 2025-08-23

## 2025-09-10 ENCOUNTER — APPOINTMENT (OUTPATIENT)
Dept: PRIMARY CARE | Facility: CLINIC | Age: OVER 89
End: 2025-09-10
Payer: MEDICARE